# Patient Record
Sex: MALE | Race: OTHER | Employment: FULL TIME | URBAN - METROPOLITAN AREA
[De-identification: names, ages, dates, MRNs, and addresses within clinical notes are randomized per-mention and may not be internally consistent; named-entity substitution may affect disease eponyms.]

---

## 2018-04-21 ENCOUNTER — HOSPITAL ENCOUNTER (EMERGENCY)
Age: 33
Discharge: HOME | End: 2018-04-21
Payer: COMMERCIAL

## 2018-04-21 VITALS
HEIGHT: 62 IN | OXYGEN SATURATION: 96 % | HEART RATE: 100 BPM | DIASTOLIC BLOOD PRESSURE: 88 MMHG | RESPIRATION RATE: 14 BRPM | WEIGHT: 180 LBS | TEMPERATURE: 99.5 F | SYSTOLIC BLOOD PRESSURE: 132 MMHG | BODY MASS INDEX: 33.13 KG/M2

## 2018-04-21 DIAGNOSIS — J02.0 STREP PHARYNGITIS: Primary | ICD-10-CM

## 2018-04-21 LAB
RAPID INFLUENZA  B AGN: NEGATIVE
RAPID INFLUENZA A AGN: NEGATIVE
S PYO AG THROAT QL: POSITIVE

## 2018-04-21 PROCEDURE — 86403 PARTICLE AGGLUT ANTBDY SCRN: CPT

## 2018-04-21 PROCEDURE — 99282 EMERGENCY DEPT VISIT SF MDM: CPT

## 2018-04-21 PROCEDURE — 87880 STREP A ASSAY W/OPTIC: CPT

## 2018-04-21 RX ORDER — AZITHROMYCIN 250 MG/1
TABLET, FILM COATED ORAL
Qty: 6 TABLET | Refills: 0 | Status: SHIPPED | OUTPATIENT
Start: 2018-04-21 | End: 2018-05-01

## 2018-04-21 RX ORDER — IBUPROFEN 600 MG/1
600 TABLET ORAL EVERY 8 HOURS PRN
Qty: 20 TABLET | Refills: 0 | Status: SHIPPED | OUTPATIENT
Start: 2018-04-21 | End: 2021-03-05 | Stop reason: ALTCHOICE

## 2018-04-21 ASSESSMENT — ENCOUNTER SYMPTOMS
VOMITING: 0
COUGH: 0
TROUBLE SWALLOWING: 0
SINUS PAIN: 0
SHORTNESS OF BREATH: 0
ABDOMINAL PAIN: 0
NAUSEA: 0
BACK PAIN: 0
SORE THROAT: 1

## 2018-04-21 ASSESSMENT — PAIN DESCRIPTION - DESCRIPTORS: DESCRIPTORS: ACHING

## 2018-04-21 ASSESSMENT — PAIN SCALES - GENERAL: PAINLEVEL_OUTOF10: 10

## 2018-04-21 ASSESSMENT — PAIN DESCRIPTION - LOCATION: LOCATION: GENERALIZED

## 2018-04-21 ASSESSMENT — PAIN DESCRIPTION - PAIN TYPE: TYPE: ACUTE PAIN

## 2021-03-01 ENCOUNTER — APPOINTMENT (OUTPATIENT)
Dept: GENERAL RADIOLOGY | Age: 36
DRG: 247 | End: 2021-03-01

## 2021-03-01 ENCOUNTER — HOSPITAL ENCOUNTER (INPATIENT)
Age: 36
LOS: 1 days | Discharge: HOME | DRG: 247 | End: 2021-03-03
Attending: INTERNAL MEDICINE | Admitting: INTERNAL MEDICINE
Payer: COMMERCIAL

## 2021-03-01 DIAGNOSIS — R07.9 CHEST PAIN, UNSPECIFIED TYPE: Primary | ICD-10-CM

## 2021-03-01 DIAGNOSIS — R94.31 ABNORMAL ECG: ICD-10-CM

## 2021-03-01 LAB
ALBUMIN SERPL-MCNC: 4.4 G/DL (ref 3.5–4.6)
ALP BLD-CCNC: 123 U/L (ref 35–104)
ALT SERPL-CCNC: 38 U/L (ref 0–41)
AMPHETAMINE SCREEN, URINE: ABNORMAL
ANION GAP SERPL CALCULATED.3IONS-SCNC: 8 MEQ/L (ref 9–15)
APTT: 27.1 SEC (ref 24.4–36.8)
AST SERPL-CCNC: 22 U/L (ref 0–40)
BARBITURATE SCREEN URINE: ABNORMAL
BASOPHILS ABSOLUTE: 0.1 K/UL (ref 0–0.2)
BASOPHILS RELATIVE PERCENT: 1 %
BENZODIAZEPINE SCREEN, URINE: ABNORMAL
BILIRUB SERPL-MCNC: 0.3 MG/DL (ref 0.2–0.7)
BUN BLDV-MCNC: 17 MG/DL (ref 6–20)
CALCIUM SERPL-MCNC: 9.3 MG/DL (ref 8.5–9.9)
CANNABINOID SCREEN URINE: ABNORMAL
CHLORIDE BLD-SCNC: 103 MEQ/L (ref 95–107)
CO2: 28 MEQ/L (ref 20–31)
COCAINE METABOLITE SCREEN URINE: ABNORMAL
CREAT SERPL-MCNC: 0.82 MG/DL (ref 0.7–1.2)
D DIMER: <0.27 MG/L FEU (ref 0–0.5)
EOSINOPHILS ABSOLUTE: 0.2 K/UL (ref 0–0.7)
EOSINOPHILS RELATIVE PERCENT: 2.2 %
ETHANOL PERCENT: NORMAL G/DL
ETHANOL: <10 MG/DL (ref 0–0.08)
GFR AFRICAN AMERICAN: >60
GFR NON-AFRICAN AMERICAN: >60
GLOBULIN: 3 G/DL (ref 2.3–3.5)
GLUCOSE BLD-MCNC: 138 MG/DL (ref 70–99)
HCT VFR BLD CALC: 49.4 % (ref 42–52)
HEMOGLOBIN: 16.9 G/DL (ref 14–18)
INR BLD: 0.9
LV EF: 60 %
LVEF MODALITY: NORMAL
LYMPHOCYTES ABSOLUTE: 3.7 K/UL (ref 1–4.8)
LYMPHOCYTES RELATIVE PERCENT: 37 %
Lab: ABNORMAL
MCH RBC QN AUTO: 30.2 PG (ref 27–31.3)
MCHC RBC AUTO-ENTMCNC: 34.3 % (ref 33–37)
MCV RBC AUTO: 87.9 FL (ref 80–100)
METHADONE SCREEN, URINE: ABNORMAL
MONOCYTES ABSOLUTE: 0.8 K/UL (ref 0.2–0.8)
MONOCYTES RELATIVE PERCENT: 7.7 %
NEUTROPHILS ABSOLUTE: 5.3 K/UL (ref 1.4–6.5)
NEUTROPHILS RELATIVE PERCENT: 52.1 %
OPIATE SCREEN URINE: POSITIVE
OXYCODONE URINE: ABNORMAL
PDW BLD-RTO: 13.5 % (ref 11.5–14.5)
PHENCYCLIDINE SCREEN URINE: ABNORMAL
PLATELET # BLD: 243 K/UL (ref 130–400)
POTASSIUM SERPL-SCNC: 4.4 MEQ/L (ref 3.4–4.9)
PRO-BNP: 6 PG/ML
PROPOXYPHENE SCREEN: ABNORMAL
PROTHROMBIN TIME: 12.2 SEC (ref 12.3–14.9)
RBC # BLD: 5.61 M/UL (ref 4.7–6.1)
SARS-COV-2, NAAT: NOT DETECTED
SODIUM BLD-SCNC: 139 MEQ/L (ref 135–144)
TOTAL CK: 158 U/L (ref 0–190)
TOTAL PROTEIN: 7.4 G/DL (ref 6.3–8)
TROPONIN: 0.02 NG/ML (ref 0–0.01)
TROPONIN: <0.01 NG/ML (ref 0–0.01)
WBC # BLD: 10.1 K/UL (ref 4.8–10.8)

## 2021-03-01 PROCEDURE — 96376 TX/PRO/DX INJ SAME DRUG ADON: CPT

## 2021-03-01 PROCEDURE — 82550 ASSAY OF CK (CPK): CPT

## 2021-03-01 PROCEDURE — 93005 ELECTROCARDIOGRAM TRACING: CPT | Performed by: EMERGENCY MEDICINE

## 2021-03-01 PROCEDURE — 96372 THER/PROPH/DIAG INJ SC/IM: CPT

## 2021-03-01 PROCEDURE — G0378 HOSPITAL OBSERVATION PER HR: HCPCS

## 2021-03-01 PROCEDURE — 80307 DRUG TEST PRSMV CHEM ANLYZR: CPT

## 2021-03-01 PROCEDURE — 96375 TX/PRO/DX INJ NEW DRUG ADDON: CPT

## 2021-03-01 PROCEDURE — 85379 FIBRIN DEGRADATION QUANT: CPT

## 2021-03-01 PROCEDURE — 36415 COLL VENOUS BLD VENIPUNCTURE: CPT

## 2021-03-01 PROCEDURE — 82077 ASSAY SPEC XCP UR&BREATH IA: CPT

## 2021-03-01 PROCEDURE — 85730 THROMBOPLASTIN TIME PARTIAL: CPT

## 2021-03-01 PROCEDURE — 6370000000 HC RX 637 (ALT 250 FOR IP): Performed by: PHYSICIAN ASSISTANT

## 2021-03-01 PROCEDURE — 99285 EMERGENCY DEPT VISIT HI MDM: CPT

## 2021-03-01 PROCEDURE — 83880 ASSAY OF NATRIURETIC PEPTIDE: CPT

## 2021-03-01 PROCEDURE — 96374 THER/PROPH/DIAG INJ IV PUSH: CPT

## 2021-03-01 PROCEDURE — 2580000003 HC RX 258: Performed by: PHYSICIAN ASSISTANT

## 2021-03-01 PROCEDURE — 99220 PR INITIAL OBSERVATION CARE/DAY 70 MINUTES: CPT | Performed by: INTERNAL MEDICINE

## 2021-03-01 PROCEDURE — 6360000002 HC RX W HCPCS: Performed by: PHYSICIAN ASSISTANT

## 2021-03-01 PROCEDURE — 6370000000 HC RX 637 (ALT 250 FOR IP): Performed by: INTERNAL MEDICINE

## 2021-03-01 PROCEDURE — 84484 ASSAY OF TROPONIN QUANT: CPT

## 2021-03-01 PROCEDURE — 71045 X-RAY EXAM CHEST 1 VIEW: CPT

## 2021-03-01 PROCEDURE — 87635 SARS-COV-2 COVID-19 AMP PRB: CPT

## 2021-03-01 PROCEDURE — 85610 PROTHROMBIN TIME: CPT

## 2021-03-01 PROCEDURE — 80053 COMPREHEN METABOLIC PANEL: CPT

## 2021-03-01 PROCEDURE — 93010 ELECTROCARDIOGRAM REPORT: CPT | Performed by: INTERNAL MEDICINE

## 2021-03-01 PROCEDURE — 93306 TTE W/DOPPLER COMPLETE: CPT

## 2021-03-01 PROCEDURE — 85025 COMPLETE CBC W/AUTO DIFF WBC: CPT

## 2021-03-01 RX ORDER — ASPIRIN 81 MG/1
324 TABLET, CHEWABLE ORAL ONCE
Status: COMPLETED | OUTPATIENT
Start: 2021-03-01 | End: 2021-03-01

## 2021-03-01 RX ORDER — ONDANSETRON 2 MG/ML
4 INJECTION INTRAMUSCULAR; INTRAVENOUS ONCE
Status: COMPLETED | OUTPATIENT
Start: 2021-03-01 | End: 2021-03-01

## 2021-03-01 RX ORDER — POLYETHYLENE GLYCOL 3350 17 G/17G
17 POWDER, FOR SOLUTION ORAL DAILY PRN
Status: DISCONTINUED | OUTPATIENT
Start: 2021-03-01 | End: 2021-03-03 | Stop reason: HOSPADM

## 2021-03-01 RX ORDER — ATORVASTATIN CALCIUM 80 MG/1
80 TABLET, FILM COATED ORAL NIGHTLY
Status: DISCONTINUED | OUTPATIENT
Start: 2021-03-01 | End: 2021-03-01

## 2021-03-01 RX ORDER — 0.9 % SODIUM CHLORIDE 0.9 %
1000 INTRAVENOUS SOLUTION INTRAVENOUS ONCE
Status: COMPLETED | OUTPATIENT
Start: 2021-03-01 | End: 2021-03-01

## 2021-03-01 RX ORDER — ONDANSETRON 2 MG/ML
4 INJECTION INTRAMUSCULAR; INTRAVENOUS EVERY 6 HOURS PRN
Status: DISCONTINUED | OUTPATIENT
Start: 2021-03-01 | End: 2021-03-03 | Stop reason: HOSPADM

## 2021-03-01 RX ORDER — MORPHINE SULFATE 2 MG/ML
4 INJECTION, SOLUTION INTRAMUSCULAR; INTRAVENOUS ONCE
Status: COMPLETED | OUTPATIENT
Start: 2021-03-01 | End: 2021-03-01

## 2021-03-01 RX ORDER — SODIUM CHLORIDE 0.9 % (FLUSH) 0.9 %
10 SYRINGE (ML) INJECTION EVERY 12 HOURS SCHEDULED
Status: DISCONTINUED | OUTPATIENT
Start: 2021-03-01 | End: 2021-03-03 | Stop reason: HOSPADM

## 2021-03-01 RX ORDER — SODIUM CHLORIDE 0.9 % (FLUSH) 0.9 %
10 SYRINGE (ML) INJECTION PRN
Status: DISCONTINUED | OUTPATIENT
Start: 2021-03-01 | End: 2021-03-03 | Stop reason: HOSPADM

## 2021-03-01 RX ORDER — ACETAMINOPHEN 325 MG/1
650 TABLET ORAL EVERY 6 HOURS PRN
Status: DISCONTINUED | OUTPATIENT
Start: 2021-03-01 | End: 2021-03-03 | Stop reason: HOSPADM

## 2021-03-01 RX ORDER — ATORVASTATIN CALCIUM 20 MG/1
20 TABLET, FILM COATED ORAL NIGHTLY
Status: DISCONTINUED | OUTPATIENT
Start: 2021-03-01 | End: 2021-03-02

## 2021-03-01 RX ORDER — NITROGLYCERIN 0.4 MG/1
0.4 TABLET SUBLINGUAL EVERY 5 MIN PRN
Status: DISCONTINUED | OUTPATIENT
Start: 2021-03-01 | End: 2021-03-01 | Stop reason: SDUPTHER

## 2021-03-01 RX ORDER — ASPIRIN 81 MG/1
81 TABLET, CHEWABLE ORAL DAILY
Status: DISCONTINUED | OUTPATIENT
Start: 2021-03-02 | End: 2021-03-03 | Stop reason: HOSPADM

## 2021-03-01 RX ORDER — NITROGLYCERIN 0.4 MG/1
0.4 TABLET SUBLINGUAL EVERY 5 MIN PRN
Status: DISCONTINUED | OUTPATIENT
Start: 2021-03-01 | End: 2021-03-03 | Stop reason: HOSPADM

## 2021-03-01 RX ORDER — ACETAMINOPHEN 650 MG/1
650 SUPPOSITORY RECTAL EVERY 6 HOURS PRN
Status: DISCONTINUED | OUTPATIENT
Start: 2021-03-01 | End: 2021-03-03 | Stop reason: HOSPADM

## 2021-03-01 RX ORDER — PROMETHAZINE HYDROCHLORIDE 12.5 MG/1
12.5 TABLET ORAL EVERY 6 HOURS PRN
Status: DISCONTINUED | OUTPATIENT
Start: 2021-03-01 | End: 2021-03-03 | Stop reason: HOSPADM

## 2021-03-01 RX ADMIN — ONDANSETRON 4 MG: 2 INJECTION INTRAMUSCULAR; INTRAVENOUS at 08:57

## 2021-03-01 RX ADMIN — ATORVASTATIN CALCIUM 20 MG: 20 TABLET, FILM COATED ORAL at 20:52

## 2021-03-01 RX ADMIN — MORPHINE SULFATE 4 MG: 2 INJECTION, SOLUTION INTRAMUSCULAR; INTRAVENOUS at 08:57

## 2021-03-01 RX ADMIN — NITROGLYCERIN 0.4 MG: 0.4 TABLET, ORALLY DISINTEGRATING SUBLINGUAL at 07:12

## 2021-03-01 RX ADMIN — ASPIRIN 324 MG: 81 TABLET, CHEWABLE ORAL at 07:03

## 2021-03-01 RX ADMIN — NITROGLYCERIN 0.4 MG: 0.4 TABLET, ORALLY DISINTEGRATING SUBLINGUAL at 07:22

## 2021-03-01 RX ADMIN — SODIUM CHLORIDE 1000 ML: 9 INJECTION, SOLUTION INTRAVENOUS at 07:20

## 2021-03-01 RX ADMIN — ONDANSETRON 4 MG: 2 INJECTION INTRAMUSCULAR; INTRAVENOUS at 07:04

## 2021-03-01 RX ADMIN — ENOXAPARIN SODIUM 40 MG: 40 INJECTION SUBCUTANEOUS at 20:52

## 2021-03-01 RX ADMIN — NITROGLYCERIN 1 INCH: 20 OINTMENT TOPICAL at 07:29

## 2021-03-01 RX ADMIN — NITROGLYCERIN 0.4 MG: 0.4 TABLET, ORALLY DISINTEGRATING SUBLINGUAL at 07:06

## 2021-03-01 RX ADMIN — SODIUM CHLORIDE, PRESERVATIVE FREE 10 ML: 5 INJECTION INTRAVENOUS at 20:53

## 2021-03-01 ASSESSMENT — ENCOUNTER SYMPTOMS
EYES NEGATIVE: 1
WHEEZING: 0
ABDOMINAL PAIN: 0
ALLERGIC/IMMUNOLOGIC NEGATIVE: 1
NAUSEA: 1
VOMITING: 0
SHORTNESS OF BREATH: 1

## 2021-03-01 ASSESSMENT — PAIN DESCRIPTION - DESCRIPTORS
DESCRIPTORS: ACHING

## 2021-03-01 ASSESSMENT — PAIN SCALES - GENERAL
PAINLEVEL_OUTOF10: 4
PAINLEVEL_OUTOF10: 0
PAINLEVEL_OUTOF10: 4
PAINLEVEL_OUTOF10: 0
PAINLEVEL_OUTOF10: 9

## 2021-03-01 ASSESSMENT — PAIN DESCRIPTION - FREQUENCY
FREQUENCY: CONTINUOUS
FREQUENCY: CONTINUOUS

## 2021-03-01 ASSESSMENT — PAIN DESCRIPTION - LOCATION
LOCATION: CHEST

## 2021-03-01 ASSESSMENT — PAIN DESCRIPTION - PAIN TYPE
TYPE: ACUTE PAIN

## 2021-03-01 NOTE — LETTER
[facility] 1W Telemetry  [place] [address]  Phone: [phone]    No name on file. March 3, 2021     Patient: Omar Bower   YOB: 1985   Date of Visit: 3/1/2021       To Whom It May Concern: It is my medical opinion that Omar Bower may return to work on monday March 8th, 2021 without restrictions. .    If you have any questions or concerns, please don't hesitate to call. Sincerely,    Electronically signed by Stoney Baker DO on 3/3/2021 at 10:59 AM      No name on file.

## 2021-03-01 NOTE — ED PROVIDER NOTES
[address] [facility]  eMERGENCY dEPARTMENT eNCOUnter      Pt Name: Mireya Short  MRN: [id number]  [place] 1985  Date of evaluation: 3/1/2021  Provider: Daija Redd PA-C    CHIEF COMPLAINT       Chief Complaint   Patient presents with    Chest Pain         HISTORY OF PRESENT ILLNESS   (Location/Symptom, Timing/Onset,Context/Setting, Quality, Duration, Modifying Factors, Severity)  Note limiting factors. Mireya Short is a 38 y.o. male who presents to the emergency department complaint of left-sided chest pain which patient states started approximately 4 AM in the morning, awakening him from sleep. Patient denies any past history of heart disease. Patient states that the time of onset he did have some shortness of breath, nausea, no vomiting. Patient states no radiation of pain, denies any acute injury, he states he does have increased pain with deep inspiration, there is no pain with palpation, patient denies any acute injury. He rates his current pain is an 8 out of 10 at this time. She denies any past medical history, he states he smokes cigarettes approximately 3/day, does not consume alcohol, and denies any substance abuse issues. HPI    NursingNotes were reviewed. REVIEW OF SYSTEMS    (2-9 systems for level 4, 10 or more for level 5)     Review of Systems   Constitutional: Negative for activity change and appetite change. HENT: Negative for congestion, ear discharge, ear pain, nosebleeds, rhinorrhea and sore throat. Eyes: Negative for discharge. Respiratory: Negative for shortness of breath. Cardiovascular: Positive for chest pain. Negative for palpitations and leg swelling. Gastrointestinal: Positive for nausea. Negative for abdominal distention, abdominal pain, constipation, diarrhea and vomiting. Genitourinary: Negative for difficulty urinating and dysuria. Musculoskeletal: Negative for arthralgias.    Skin: Negative for color change, pallor, rash and wound.   Neurological: Negative for dizziness, tremors, syncope, weakness, numbness and headaches. Psychiatric/Behavioral: Negative for agitation and confusion. Except as noted above the remainder of the review of systems was reviewed and negative. PAST MEDICAL HISTORY   History reviewed. No pertinent past medical history. SURGICALHISTORY     History reviewed. No pertinent surgical history. CURRENT MEDICATIONS       Current Discharge Medication List      CONTINUE these medications which have NOT CHANGED    Details   ibuprofen (IBU) 600 MG tablet Take 1 tablet by mouth every 8 hours as needed for Pain  Qty: 20 tablet, Refills: 0             ALLERGIES     Pcn [penicillins] and Shellfish-derived products    FAMILY HISTORY     History reviewed. No pertinent family history.        SOCIAL HISTORY       Social History     Socioeconomic History    Marital status:      Spouse name: None    Number of children: None    Years of education: None    Highest education level: None   Occupational History    None   Social Needs    Financial resource strain: None    Food insecurity     Worry: None     Inability: None    Transportation needs     Medical: None     Non-medical: None   Tobacco Use    Smoking status: Current Every Day Smoker     Types: Cigarettes    Smokeless tobacco: Never Used   Substance and Sexual Activity    Alcohol use: Yes     Comment: weekends    Drug use: No    Sexual activity: None   Lifestyle    Physical activity     Days per week: None     Minutes per session: None    Stress: None   Relationships    Social connections     Talks on phone: None     Gets together: None     Attends Episcopal service: None     Active member of club or organization: None     Attends meetings of clubs or organizations: None     Relationship status: None    Intimate partner violence     Fear of current or ex partner: None     Emotionally abused: None     Physically abused: None     Forced sexual activity: None   Other Topics Concern    None   Social History Narrative    None       SCREENINGS    Concepcion Coma Scale  Eye Opening: Spontaneous  Best Verbal Response: Oriented  Best Motor Response: Obeys commands  [place] Coma Scale Score: 15 @FLOW([id number])@      PHYSICAL EXAM    (up to 7 for level 4, 8 or more for level 5)     ED Triage Vitals [03/01/21 0635]   BP Temp Temp Source Pulse Resp SpO2 Height Weight   (!) 169/101 97.9 °F (36.6 °C) Temporal 84 20 98 % 5' 2\" (1.575 m) 198 lb (89.8 kg)       Physical Exam  Vitals signs and nursing note reviewed. Constitutional:       General: He is not in acute distress. Appearance: He is well-developed. He is not ill-appearing, toxic-appearing or diaphoretic. HENT:      Head: Normocephalic. Nose: No congestion. Mouth/Throat:      Mouth: Mucous membranes are moist.      Pharynx: No oropharyngeal exudate or posterior oropharyngeal erythema. Eyes:      Extraocular Movements: Extraocular movements intact. Conjunctiva/sclera: Conjunctivae normal.      Pupils: Pupils are equal, round, and reactive to light. Neck:      Musculoskeletal: Normal range of motion and neck supple. No neck rigidity. Vascular: No JVD. Trachea: No tracheal deviation. Cardiovascular:      Rate and Rhythm: Normal rate. Pulses: Normal pulses. Heart sounds: Normal heart sounds. No murmur. No friction rub. No gallop. Pulmonary:      Effort: Pulmonary effort is normal. No tachypnea, accessory muscle usage, respiratory distress or retractions. Breath sounds: No stridor. No wheezing, rhonchi or rales. Comments: Lung sounds are clear in all fields, there is no wheezes rales or rhonchi, accessory muscle use, no retractions. Room Air saturations are 98%  Chest:      Chest wall: No tenderness. Abdominal:      General: Abdomen is flat. Bowel sounds are normal. There is no distension or abdominal bruit. Palpations:  There is no shifting dullness, fluid wave, hepatomegaly, splenomegaly, mass or pulsatile mass. Tenderness: There is no abdominal tenderness. There is no right CVA tenderness, left CVA tenderness, guarding or rebound. Negative signs include Lemus's sign, Rovsing's sign and McBurney's sign. Musculoskeletal:         General: No deformity. Right lower leg: No edema. Left lower leg: No edema. Skin:     General: Skin is warm and dry. Capillary Refill: Capillary refill takes less than 2 seconds. Coloration: Skin is not jaundiced. Neurological:      General: No focal deficit present. Mental Status: He is alert and oriented to person, place, and time. Mental status is at baseline. Cranial Nerves: No cranial nerve deficit. Sensory: No sensory deficit. Motor: No weakness. Coordination: Coordination normal.   Psychiatric:         Mood and Affect: Mood normal.         DIAGNOSTIC RESULTS     EKG: All EKG's are interpreted by the Emergency Department Physician who either signs or Co-signsthis chart in the absence of a cardiologist.    EKG shows normal sinus rhythm at 78 bpm there is T wave inversions in leads III aVF no other acute ST segment abnormality no ventricular ectopy QTC is 417 ms. No previous EKGs are available for comparison at this time    RADIOLOGY:   Non-plain filmimages such as CT, Ultrasound and MRI are read by the radiologist. Plain radiographic images are visualized and preliminarily interpreted by the emergency physician with the below findings:    Chest x-ray shows no acute pulmonary process    Interpretation per the Radiologist below, if available at the time ofthis note:    XR CHEST PORTABLE   Final Result   NO ACUTE CARDIOPULMONARY ABNORMALITY.                ED BEDSIDE ULTRASOUND:   Performed by ED Physician - none    LABS:  Labs Reviewed   COMPREHENSIVE METABOLIC PANEL - Abnormal; Notable for the following components:       Result Value    Anion Gap 8 (*)     Glucose 138 (*)     Alkaline Phosphatase 123 (*)     All other components within normal limits   PROTIME-INR - Abnormal; Notable for the following components:    Protime 12.2 (*)     All other components within normal limits   URINE DRUG SCREEN - Abnormal; Notable for the following components:    Opiate Scrn, Ur POSITIVE (*)     All other components within normal limits   TROPONIN - Abnormal; Notable for the following components:    Troponin 0.022 (*)     All other components within normal limits    Narrative:     CALL  Tadeo  LC1W tel. [phone],  Troponin results called to and read back by Kika Arzola, 03/01/2021  20:55, by [place]   TROPONIN - Abnormal; Notable for the following components:    Troponin 0.079 (*)     All other components within normal limits    Narrative:     CALL  Tadeo  LC1W tel. [phone],  TROPONIN results called to and read back by Cee Sanderson, 03/02/2021 00:49,  by [place] - Abnormal; Notable for the following components:    Cholesterol, Total 252 (*)     Triglycerides 347 (*)     HDL 33 (*)     LDL Calculated 150 (*)     All other components within normal limits   COVID-19, RAPID   CBC WITH AUTO DIFFERENTIAL   ETHANOL   TROPONIN   CK   APTT   D-DIMER, QUANTITATIVE   TROPONIN   TROPONIN   BRAIN NATRIURETIC PEPTIDE   MAGNESIUM   CBC   URINE DRUG SCREEN   URINE RT REFLEX TO CULTURE       All other labs were within normal range or not returned as of this dictation.     EMERGENCY DEPARTMENT COURSE and DIFFERENTIAL DIAGNOSIS/MDM:   Vitals:    Vitals:    03/01/21 1047 03/01/21 1326 03/01/21 1932 03/02/21 0110   BP: (!) 140/71 138/68 (!) 142/75 (!) 146/89   Pulse: 88 81 87 81   Resp: 18  18    Temp: 97.2 °F (36.2 °C)  97.5 °F (36.4 °C) 97.7 °F (36.5 °C)   TempSrc: Oral  Oral    SpO2: 97% 97% 98% 99%   Weight: 207 lb (93.9 kg)      Height: 5' 2\" (1.575 m)             MDM  Number of Diagnoses or Management Options  Abnormal ECG  Chest pain, unspecified type  Diagnosis management comments: Patient presented to ED with complaint of sudden onset of chest pain which woke him up at 4 AM this morning. Patient stated was left-sided, he describes it as a heavy squeezing or pressure type pain to the left side of his chest along with shortness of breath and nausea. He denies any past history of coronary disease, he has no outstanding medical history according to patient. He is occasional smoker, drinks occasionally, denies any substance abuse issues. EKG shows T wave inversions in leads III and aVF. There are no old EKGs available for comparison at this time. Patient's chest pain initially is a 7 out of 10, after aspirin and nitroglycerin he is down to approximately a 3 out of 10. He states that his pain did begin to worsen, he was given morphine and Zofran and is much more comfortable at this time. His cardiac enzymes are negative initial set, I did speak with [facility] cardiology Dr. Claire Mars, he will accept admission of this patient for further evaluation management for chest pain. CRITICAL CARE TIME   Total Critical Care time was 0 minutes, excluding separately reportableprocedures. There was a high probability of clinicallysignificant/life threatening deterioration in the patient's condition which required my urgent intervention. CONSULTS:  IP CONSULT TO CARDIOLOGY    PROCEDURES:  Unless otherwise noted below, none     Procedures    FINAL IMPRESSION      1. Chest pain, unspecified type    2. Abnormal ECG          DISPOSITION/PLAN   DISPOSITION Admitted 03/01/2021 08:52:22 AM      PATIENT REFERRED TO:  No follow-up provider specified.     DISCHARGE MEDICATIONS:  Current Discharge Medication List             (Please note that portions of this note were completed with a voice recognition program.  Efforts were made to edit the dictations but occasionally words are mis-transcribed.)    Cleopatra Tuttle PA-C (electronically signed)  Attending Emergency Physician         Cleopatra Tuttle PA-C  03/01/21 3[facility] Jose Alford PA-C  03/02/21 6622

## 2021-03-01 NOTE — H&P
Chief Complaint   Patient presents with    Chest Pain        Patient is a 38 y.o. male who presents with a chief complaint of chest pain. Patient states he developed a left-sided chest discomfort/910 chest pain that awakened him from sleep lasting for hours and actually still ongoing 3 out of 10 at this time. States he had associated shortness of breath, nausea and diaphoresis. Patient states his symptoms improved with sublingual nitroglycerin. Patient denies any history of myocardial infarction, congestive heart failure or arrhythmia. He denies any history of stress test or cardiac catheterization. Admits to history of hypertension hyperlipidemia. He also admits to smoking but denies any drugs. Initial blood pressure on arrival was 169/101. Initial cardiac enzyme is negative. EKG with normal sinus rhythm, T wave inversion in leads III and aVF. History reviewed. No pertinent past medical history. Patient Active Problem List   Diagnosis    Chest pain       History reviewed. No pertinent surgical history.     Social History     Socioeconomic History    Marital status:      Spouse name: None    Number of children: None    Years of education: None    Highest education level: None   Occupational History    None   Social Needs    Financial resource strain: None    Food insecurity     Worry: None     Inability: None    Transportation needs     Medical: None     Non-medical: None   Tobacco Use    Smoking status: Current Every Day Smoker     Types: Cigarettes    Smokeless tobacco: Never Used   Substance and Sexual Activity    Alcohol use: Yes     Comment: weekends    Drug use: No    Sexual activity: None   Lifestyle    Physical activity     Days per week: None     Minutes per session: None    Stress: None   Relationships    Social connections     Talks on phone: None     Gets together: None     Attends Sabianist service: None     Active member of club or organization: None Attends meetings of clubs or organizations: None     Relationship status: None    Intimate partner violence     Fear of current or ex partner: None     Emotionally abused: None     Physically abused: None     Forced sexual activity: None   Other Topics Concern    None   Social History Narrative    None       History reviewed. No pertinent family history. Current Facility-Administered Medications   Medication Dose Route Frequency Provider Last Rate Last Admin    nitroGLYCERIN (NITROSTAT) SL tablet 0.4 mg  0.4 mg Sublingual Q5 Min PRN Campos Found, PA-C   0.4 mg at 03/01/21 1612    sodium chloride flush 0.9 % injection 10 mL  10 mL Intravenous 2 times per day Campos Found, PA-C        sodium chloride flush 0.9 % injection 10 mL  10 mL Intravenous PRN Clements Found, PA-C        promethazine (PHENERGAN) tablet 12.5 mg  12.5 mg Oral Q6H PRN Clements Found, PA-C        Or    ondansetron [facility]) injection 4 mg  4 mg Intravenous Q6H PRN Clements Found, PA-C        acetaminophen (TYLENOL) tablet 650 mg  650 mg Oral Q6H PRN Clements Found, PA-C        Or    acetaminophen (TYLENOL) suppository 650 mg  650 mg Rectal Q6H PRN Campos Found, PA-C        polyethylene glycol [facility]) packet 17 g  17 g Oral Daily PRN Clements Found, PA-C        [START ON 3/2/2021] aspirin chewable tablet 81 mg  81 mg Oral Daily Johnathon Mcrae PA-C        enoxaparin (LOVENOX) injection 40 mg  40 mg Subcutaneous Daily Johnathon Mcrae PA-C        atorvastatin (LIPITOR) tablet 80 mg  80 mg Oral Nightly Richard Sherren Shelling, PA-C           ALLERGIES: Pcn [penicillins]    Review of Systems   Constitutional: Positive for diaphoresis. Negative for chills and fever. HENT: Negative. Eyes: Negative. Respiratory: Positive for shortness of breath. Negative for wheezing. Cardiovascular: Positive for chest pain. Negative for palpitations and leg swelling. Gastrointestinal: Positive for nausea.  Negative for abdominal pain and vomiting. Endocrine: Negative. Genitourinary: Negative. Musculoskeletal: Negative. Skin: Negative. Negative for rash. Allergic/Immunologic: Negative. Neurological: Negative for dizziness, weakness and headaches. Hematological: Negative. Psychiatric/Behavioral: Negative. VITALS:  Blood pressure (!) 140/71, pulse 88, temperature 97.2 °F (36.2 °C), temperature source Oral, resp. rate 18, height 5' 2\" (1.575 m), weight 207 lb (93.9 kg), SpO2 97 %. Body mass index is 37.86 kg/m². Physical Exam   Constitutional: He is oriented to person, place, and time. He appears well-developed and well-nourished. HENT:   Head: Normocephalic and atraumatic. Eyes: Pupils are equal, round, and reactive to light. Neck: Normal range of motion. Neck supple. No JVD present. No tracheal deviation present. No thyromegaly present. Cardiovascular: Normal rate, regular rhythm, normal heart sounds and intact distal pulses. PMI is not displaced. Exam reveals no gallop, no S3, no distant heart sounds and no friction rub. No murmur heard. Pulmonary/Chest: No respiratory distress. He has no wheezes. He has no rales. He exhibits no tenderness. Abdominal: Soft. Bowel sounds are normal. He exhibits no distension and no mass. There is no abdominal tenderness. There is no rebound and no guarding. Musculoskeletal:         General: No edema. Neurological: He is alert and oriented to person, place, and time. No cranial nerve deficit. Skin: Skin is warm and dry. No rash noted. He is not diaphoretic. No erythema. No pallor. Psychiatric: He has a normal mood and affect.  His behavior is normal. Judgment and thought content normal.       LABS:  Recent Results (from the past 24 hour(s))   EKG 12 Lead    Collection Time: 03/01/21  6:44 AM   Result Value Ref Range    Ventricular Rate 78 BPM    Atrial Rate 78 BPM    P-R Interval 144 ms    QRS Duration 78 ms    Q-T Interval 366 ms    QTc Calculation ([place]) 417 ms    P Axis 14 degrees    R Axis 45 degrees    T Axis -5 degrees   Comprehensive Metabolic Panel    Collection Time: 03/01/21  7:00 AM   Result Value Ref Range    Sodium 139 135 - 144 mEq/L    Potassium 4.4 3.4 - 4.9 mEq/L    Chloride 103 95 - 107 mEq/L    CO2 28 20 - 31 mEq/L    Anion Gap 8 (L) 9 - 15 mEq/L    Glucose 138 (H) 70 - 99 mg/dL    BUN 17 6 - 20 mg/dL    CREATININE 0.82 0.70 - 1.20 mg/dL    GFR Non-African American >60.0 >60    GFR  >60.0 >60    Calcium 9.3 8.5 - 9.9 mg/dL    Total Protein 7.4 6.3 - 8.0 g/dL    Albumin 4.4 3.5 - 4.6 g/dL    Total Bilirubin 0.3 0.2 - 0.7 mg/dL    Alkaline Phosphatase 123 (H) 35 - 104 U/L    ALT 38 0 - 41 U/L    AST 22 0 - 40 U/L    Globulin 3.0 2.3 - 3.5 g/dL   CBC Auto Differential    Collection Time: 03/01/21  7:00 AM   Result Value Ref Range    WBC 10.1 4.8 - 10.8 K/uL    RBC 5.61 4.70 - 6.10 M/uL    Hemoglobin 16.9 14.0 - 18.0 g/dL    Hematocrit 49.4 42.0 - 52.0 %    MCV 87.9 80.0 - 100.0 fL    MCH 30.2 27.0 - 31.3 pg    MCHC 34.3 33.0 - 37.0 %    RDW 13.5 11.5 - 14.5 %    Platelets 406 906 - 321 K/uL    Neutrophils % 52.1 %    Lymphocytes % 37.0 %    Monocytes % 7.7 %    Eosinophils % 2.2 %    Basophils % 1.0 %    Neutrophils Absolute 5.3 1.4 - 6.5 K/uL    Lymphocytes Absolute 3.7 1.0 - 4.8 K/uL    Monocytes Absolute 0.8 0.2 - 0.8 K/uL    Eosinophils Absolute 0.2 0.0 - 0.7 K/uL    Basophils Absolute 0.1 0.0 - 0.2 K/uL   Ethanol    Collection Time: 03/01/21  7:00 AM   Result Value Ref Range    Ethanol Lvl <10 mg/dL    Ethanol percent Not indicated G/dL   Troponin    Collection Time: 03/01/21  7:00 AM   Result Value Ref Range    Troponin <0.010 0.000 - 0.010 ng/mL   CK    Collection Time: 03/01/21  7:00 AM   Result Value Ref Range    Total  0 - 190 U/L   Protime-INR    Collection Time: 03/01/21  7:00 AM   Result Value Ref Range    Protime 12.2 (L) 12.3 - 14.9 sec    INR 0.9    APTT    Collection Time: 03/01/21  7:00 AM Result Value Ref Range    aPTT 27.1 24.4 - 36.8 sec   D-Dimer, Quantitative    Collection Time: 03/01/21  7:00 AM   Result Value Ref Range    D-Dimer, Quant <0.27 0.00 - 0.50 mg/L FEU   COVID-19, Rapid    Collection Time: 03/01/21  7:51 AM    Specimen: Nasopharyngeal Swab   Result Value Ref Range    SARS-CoV-2, NAAT Not Detected Not Detected     Troponin:   Lab Results   Component Value Date    TROPONINI <0.010 03/01/2021       EKG: normal sinus rhythm, T wave inversion in leads III and aVF. ASSESSMENT:    Angina class IV. Rule out ACS  Essential hypertension/hypertensive urgency  Hyperlipidemia  Morbid obesity  Tobacco abuse    PLAN:   1. As always, aggressive risk factor modification is strongly recommended. We should adhere to the JNC VIII guidelines for HTN management and the NCEPATP III guidelines for LDL-C management. 2. ACS orders  3. Check 2D echocardiogram  4. Coronary evaluation when feasible. Questionable stress test versus cath based on work-up. Patient presents with classic anginal symptoms. 5. Monitor on telemetry  6. Urine drug screen  7. Tobacco cessation strongly recommended  8.  GI/DVT prophylaxis      Electronically signed by Candace Howard DO on 3/1/2021 at 1:52 PM

## 2021-03-01 NOTE — ED TRIAGE NOTES
Patient arrived from home with c/o left sided chest pain that started suddenly around 5 am this morning. Patient states pain woke him from his sleep. States pain is  \"9/10\" and describes it as \"pressure\" Denies any cardiac history. Denies taking any prescribed or over the counter meds. Patient is a current smoker. Denies any other problems or concerns. Vitals are stable.

## 2021-03-02 ENCOUNTER — APPOINTMENT (OUTPATIENT)
Dept: CARDIAC CATH/INVASIVE PROCEDURES | Age: 36
DRG: 247 | End: 2021-03-02

## 2021-03-02 LAB
CHOLESTEROL, TOTAL: 252 MG/DL (ref 0–199)
EKG ATRIAL RATE: 77 BPM
EKG ATRIAL RATE: 78 BPM
EKG P AXIS: 14 DEGREES
EKG P AXIS: 15 DEGREES
EKG P-R INTERVAL: 144 MS
EKG P-R INTERVAL: 150 MS
EKG Q-T INTERVAL: 366 MS
EKG Q-T INTERVAL: 368 MS
EKG QRS DURATION: 74 MS
EKG QRS DURATION: 78 MS
EKG QTC CALCULATION (BAZETT): 416 MS
EKG QTC CALCULATION (BAZETT): 417 MS
EKG R AXIS: 34 DEGREES
EKG R AXIS: 45 DEGREES
EKG T AXIS: -5 DEGREES
EKG T AXIS: -9 DEGREES
EKG VENTRICULAR RATE: 77 BPM
EKG VENTRICULAR RATE: 78 BPM
HCT VFR BLD CALC: 46.8 % (ref 42–52)
HDLC SERPL-MCNC: 33 MG/DL (ref 40–59)
HEMOGLOBIN: 15.9 G/DL (ref 14–18)
LDL CHOLESTEROL CALCULATED: 150 MG/DL (ref 0–129)
MAGNESIUM: 2.3 MG/DL (ref 1.7–2.4)
MCH RBC QN AUTO: 30 PG (ref 27–31.3)
MCHC RBC AUTO-ENTMCNC: 33.9 % (ref 33–37)
MCV RBC AUTO: 88.4 FL (ref 80–100)
PDW BLD-RTO: 13.7 % (ref 11.5–14.5)
PLATELET # BLD: 213 K/UL (ref 130–400)
RBC # BLD: 5.29 M/UL (ref 4.7–6.1)
TRIGL SERPL-MCNC: 347 MG/DL (ref 0–150)
TROPONIN: 0.08 NG/ML (ref 0–0.01)
WBC # BLD: 10.1 K/UL (ref 4.8–10.8)

## 2021-03-02 PROCEDURE — 2709999900 HC NON-CHARGEABLE SUPPLY

## 2021-03-02 PROCEDURE — 6360000004 HC RX CONTRAST MEDICATION: Performed by: INTERNAL MEDICINE

## 2021-03-02 PROCEDURE — 2580000003 HC RX 258: Performed by: INTERNAL MEDICINE

## 2021-03-02 PROCEDURE — 92928 PRQ TCAT PLMT NTRAC ST 1 LES: CPT | Performed by: INTERNAL MEDICINE

## 2021-03-02 PROCEDURE — C1725 CATH, TRANSLUMIN NON-LASER: HCPCS

## 2021-03-02 PROCEDURE — 6360000002 HC RX W HCPCS

## 2021-03-02 PROCEDURE — 4A023N7 MEASUREMENT OF CARDIAC SAMPLING AND PRESSURE, LEFT HEART, PERCUTANEOUS APPROACH: ICD-10-PCS | Performed by: INTERNAL MEDICINE

## 2021-03-02 PROCEDURE — 6360000002 HC RX W HCPCS: Performed by: PHYSICIAN ASSISTANT

## 2021-03-02 PROCEDURE — 85027 COMPLETE CBC AUTOMATED: CPT

## 2021-03-02 PROCEDURE — C1769 GUIDE WIRE: HCPCS

## 2021-03-02 PROCEDURE — 96372 THER/PROPH/DIAG INJ SC/IM: CPT

## 2021-03-02 PROCEDURE — 6370000000 HC RX 637 (ALT 250 FOR IP): Performed by: PHYSICIAN ASSISTANT

## 2021-03-02 PROCEDURE — 80061 LIPID PANEL: CPT

## 2021-03-02 PROCEDURE — 93458 L HRT ARTERY/VENTRICLE ANGIO: CPT | Performed by: INTERNAL MEDICINE

## 2021-03-02 PROCEDURE — 2500000003 HC RX 250 WO HCPCS

## 2021-03-02 PROCEDURE — 6360000002 HC RX W HCPCS: Performed by: INTERNAL MEDICINE

## 2021-03-02 PROCEDURE — G0378 HOSPITAL OBSERVATION PER HR: HCPCS

## 2021-03-02 PROCEDURE — C1887 CATHETER, GUIDING: HCPCS

## 2021-03-02 PROCEDURE — 93005 ELECTROCARDIOGRAM TRACING: CPT | Performed by: PHYSICIAN ASSISTANT

## 2021-03-02 PROCEDURE — B2111ZZ FLUOROSCOPY OF MULTIPLE CORONARY ARTERIES USING LOW OSMOLAR CONTRAST: ICD-10-PCS | Performed by: INTERNAL MEDICINE

## 2021-03-02 PROCEDURE — 99226 PR SBSQ OBSERVATION CARE/DAY 35 MINUTES: CPT | Performed by: INTERNAL MEDICINE

## 2021-03-02 PROCEDURE — 027034Z DILATION OF CORONARY ARTERY, ONE ARTERY WITH DRUG-ELUTING INTRALUMINAL DEVICE, PERCUTANEOUS APPROACH: ICD-10-PCS | Performed by: INTERNAL MEDICINE

## 2021-03-02 PROCEDURE — C1874 STENT, COATED/COV W/DEL SYS: HCPCS

## 2021-03-02 PROCEDURE — 2580000003 HC RX 258: Performed by: PHYSICIAN ASSISTANT

## 2021-03-02 PROCEDURE — 93010 ELECTROCARDIOGRAM REPORT: CPT | Performed by: INTERNAL MEDICINE

## 2021-03-02 PROCEDURE — 6370000000 HC RX 637 (ALT 250 FOR IP): Performed by: INTERNAL MEDICINE

## 2021-03-02 PROCEDURE — 83735 ASSAY OF MAGNESIUM: CPT

## 2021-03-02 PROCEDURE — 2580000003 HC RX 258

## 2021-03-02 PROCEDURE — C1894 INTRO/SHEATH, NON-LASER: HCPCS

## 2021-03-02 PROCEDURE — 36415 COLL VENOUS BLD VENIPUNCTURE: CPT

## 2021-03-02 RX ORDER — LISINOPRIL 10 MG/1
10 TABLET ORAL DAILY
Status: DISCONTINUED | OUTPATIENT
Start: 2021-03-02 | End: 2021-03-03 | Stop reason: HOSPADM

## 2021-03-02 RX ORDER — SODIUM CHLORIDE 9 MG/ML
INJECTION, SOLUTION INTRAVENOUS CONTINUOUS
Status: DISCONTINUED | OUTPATIENT
Start: 2021-03-02 | End: 2021-03-03

## 2021-03-02 RX ORDER — PREDNISONE 50 MG/1
50 TABLET ORAL ONCE
Status: DISCONTINUED | OUTPATIENT
Start: 2021-03-02 | End: 2021-03-03 | Stop reason: HOSPADM

## 2021-03-02 RX ORDER — HYDRALAZINE HYDROCHLORIDE 20 MG/ML
10 INJECTION INTRAMUSCULAR; INTRAVENOUS EVERY 10 MIN PRN
Status: DISCONTINUED | OUTPATIENT
Start: 2021-03-02 | End: 2021-03-03 | Stop reason: HOSPADM

## 2021-03-02 RX ORDER — ATORVASTATIN CALCIUM 40 MG/1
40 TABLET, FILM COATED ORAL NIGHTLY
Status: DISCONTINUED | OUTPATIENT
Start: 2021-03-02 | End: 2021-03-03 | Stop reason: HOSPADM

## 2021-03-02 RX ORDER — LABETALOL HYDROCHLORIDE 5 MG/ML
10 INJECTION, SOLUTION INTRAVENOUS EVERY 30 MIN PRN
Status: DISCONTINUED | OUTPATIENT
Start: 2021-03-02 | End: 2021-03-03 | Stop reason: HOSPADM

## 2021-03-02 RX ORDER — DIPHENHYDRAMINE HCL 25 MG
50 TABLET ORAL ONCE
Status: DISCONTINUED | OUTPATIENT
Start: 2021-03-02 | End: 2021-03-03 | Stop reason: HOSPADM

## 2021-03-02 RX ORDER — ACETAMINOPHEN 325 MG/1
650 TABLET ORAL EVERY 4 HOURS PRN
Status: DISCONTINUED | OUTPATIENT
Start: 2021-03-02 | End: 2021-03-03 | Stop reason: HOSPADM

## 2021-03-02 RX ADMIN — SODIUM CHLORIDE, PRESERVATIVE FREE 10 ML: 5 INJECTION INTRAVENOUS at 08:30

## 2021-03-02 RX ADMIN — METOPROLOL TARTRATE 25 MG: 25 TABLET, FILM COATED ORAL at 20:20

## 2021-03-02 RX ADMIN — TICAGRELOR 180 MG: 90 TABLET ORAL at 08:28

## 2021-03-02 RX ADMIN — ENOXAPARIN SODIUM 40 MG: 40 INJECTION SUBCUTANEOUS at 20:17

## 2021-03-02 RX ADMIN — ATORVASTATIN CALCIUM 40 MG: 40 TABLET, FILM COATED ORAL at 20:17

## 2021-03-02 RX ADMIN — METOPROLOL TARTRATE 25 MG: 25 TABLET, FILM COATED ORAL at 08:28

## 2021-03-02 RX ADMIN — ASPIRIN 81 MG: 81 TABLET, CHEWABLE ORAL at 08:28

## 2021-03-02 RX ADMIN — HYDROCORTISONE SODIUM SUCCINATE 200 MG: 100 INJECTION, POWDER, FOR SOLUTION INTRAMUSCULAR; INTRAVENOUS at 14:20

## 2021-03-02 RX ADMIN — SODIUM CHLORIDE: 9 INJECTION, SOLUTION INTRAVENOUS at 10:37

## 2021-03-02 RX ADMIN — SODIUM CHLORIDE: 9 INJECTION, SOLUTION INTRAVENOUS at 18:30

## 2021-03-02 RX ADMIN — IOVERSOL 118 ML: 678 INJECTION INTRA-ARTERIAL; INTRAVENOUS at 15:35

## 2021-03-02 RX ADMIN — TICAGRELOR 90 MG: 90 TABLET ORAL at 20:17

## 2021-03-02 RX ADMIN — LISINOPRIL 10 MG: 10 TABLET ORAL at 18:30

## 2021-03-02 ASSESSMENT — ENCOUNTER SYMPTOMS
EYE DISCHARGE: 0
ABDOMINAL DISTENTION: 0
CONSTIPATION: 0
DIARRHEA: 0
SORE THROAT: 0
SHORTNESS OF BREATH: 0
RHINORRHEA: 0
VOMITING: 0
COLOR CHANGE: 0
NAUSEA: 1
ABDOMINAL PAIN: 0

## 2021-03-02 NOTE — BRIEF OP NOTE
Section of Cardiology  Adult Brief Cardiac Cath Procedure Note        Procedure(s):  LHC, b/l coronary angio, PCI of CX    Pre-operative Diagnosis:  nstemi    H&P Status: Completed and reviewed.      Post-operative Diagnosis:      EF of 65%  NORMAL LVEDP  LM NORMAL   LAD MILD DISEASE  CX 85% DISTAL   % PROX     Findings:  See full report    Complications:  none    Primary Proceduralist:   Dr.Wes Gonzalez DO      Full procedure note to follow

## 2021-03-02 NOTE — FLOWSHEET NOTE
9314- Pt agreeable to heart cath today and consent signed    [id number]- Pt left floor to cath lab    1830- Pt back to floor from cath lab. Right radial site intact, no signs of hematoma. Vitals stable. Pulses are palpable. Pt denies any pain. Pt set up with dinner tray.  Will monitor Electronically signed by Marlene Rodrigez RN on 3/2/2021 at 6:44 PM

## 2021-03-02 NOTE — CARE COORDINATION
[facility] EMERGENCY [facility] AT [place] Case Management Initial Discharge Assessment    Met with Patient to discuss discharge plan. PCP: No primary care provider on file. Date of Last Visit:     If no PCP, list provided? Yes- CHOSE [facility]    Discharge Planning    Living Arrangements: independently at home    Who do you live with? SPOUSE    Who helps you with your care:  self    If lives at home:     Do you have any barriers navigating in your home? no    Patient can perform ADL? Yes    Current Services (outpatient and in home) :  None    Dialysis: No    Is transportation available to get to your appointments? Yes    DME Equipment:  no    Respiratory equipment: None    Respiratory provider:  no     Pharmacy:  yes - [address] [facility] [address]  with Medication Assistance Program?  No      Patient agreeable to [place] [id number]? Declined    Patient agreeable to SNF/Rehab? N/A    Other discharge needs identified? N/A    Freedom of choice list provided with basic dialogue that supports the patient's individualized plan of care/goals and shares the quality data associated with the providers. Yes      The plan for Transition of Care is related to the following treatment goals:CATH    Initial Discharge Plan? (Note: please see concurrent daily documentation for any updates after initial note). HOME, DENIES NEEDS. PT CHOSE PCP AND FREEDOM OF CHOICE OFFERED.      The Patient and/or patient representative: PATIENT was provided with choice of any post-acute providers for care and equipment and agrees with discharge plan  Yes    Electronically signed by William Neil RN on 3/2/2021 at 11:32 AM

## 2021-03-02 NOTE — PROGRESS NOTES
Patient received to pre post space cath. Alert and oriented x4. Denies complaints at this time. Right radial vasc band secure. No bleeding or hematoma.

## 2021-03-02 NOTE — FLOWSHEET NOTE
2100: PM assessment completed; pt denies needs. 0000: Resting at present. 0230: Call from lab; latest troponin=0.079. Dr. Mary Morfin notified via secure message and new order to d/c troponin draws. 0530: Resting with eyes closed.      Electronically signed by Janina Libman, RN on 3/2/2021 at 5:37 AM

## 2021-03-02 NOTE — PROGRESS NOTES
Chief Complaint   Patient presents with    Chest Pain       SUBJECTIVE: Patient with elevated cardiac enzymes. Hemodynamically stable. Echo with normal LV function. History reviewed. No pertinent past medical history.   Patient Active Problem List   Diagnosis    Chest pain       Current Facility-Administered Medications   Medication Dose Route Frequency Provider Last Rate Last Admin    0.9 % sodium chloride infusion   Intravenous Continuous Wes J Holiday, DO 70 mL/hr at 03/02/21 1037 New Bag at 03/02/21 1037    predniSONE (DELTASONE) tablet 50 mg  50 mg Oral Once Wes J Holiday, DO        diphenhydrAMINE (BENADRYL) tablet 50 mg  50 mg Oral Once Wes J Holiday, DO        metoprolol tartrate (LOPRESSOR) tablet 25 mg  25 mg Oral BID Wes J Holiday, DO   25 mg at 03/02/21 1615    nitroGLYCERIN (NITROSTAT) SL tablet 0.4 mg  0.4 mg Sublingual Q5 Min PRN Jay Christopher PA-C   0.4 mg at 03/01/21 8311    sodium chloride flush 0.9 % injection 10 mL  10 mL Intravenous 2 times per day Jay Christopher PA-C   10 mL at 03/02/21 0830    sodium chloride flush 0.9 % injection 10 mL  10 mL Intravenous PRN Jay Christopher PA-C        promethazine (PHENERGAN) tablet 12.5 mg  12.5 mg Oral Q6H PRN Jay Christopher PA-C        Or    ondansetron [facility]) injection 4 mg  4 mg Intravenous Q6H PRN Jay Christopher PA-C        acetaminophen (TYLENOL) tablet 650 mg  650 mg Oral Q6H PRN Jay Christopher PA-C        Or    acetaminophen (TYLENOL) suppository 650 mg  650 mg Rectal Q6H PRN Jay Christopher PA-C        polyethylene glycol (GLYCOLAX) packet 17 g  17 g Oral Daily PRN Jay Christopher PA-C        aspirin chewable tablet 81 mg  81 mg Oral Daily MILTON Faith-C   81 mg at 03/02/21 0828    enoxaparin (LOVENOX) injection 40 mg  40 mg Subcutaneous Daily MILTON Faith-C   40 mg at 03/01/21 2052    atorvastatin (LIPITOR) tablet 20 mg  20 mg Oral Nightly Mike Gonzalez, DO   20 mg at 03/01/21 2052 ALLERGIES: Pcn [penicillins] and Shellfish-derived products      OBJECTIVE:     VITALS:  Blood pressure 130/67, pulse 83, temperature 98.5 °F (36.9 °C), temperature source Oral, resp. rate 18, height 5' 2\" (1.575 m), weight 207 lb (93.9 kg), SpO2 96 %. Body mass index is 37.86 kg/m². Physical Exam   Constitutional: He is oriented to person, place, and time. He appears well-developed and well-nourished. HENT:   Head: Normocephalic and atraumatic. Eyes: Pupils are equal, round, and reactive to light. Neck: Normal range of motion. Neck supple. No JVD present. No tracheal deviation present. No thyromegaly present. Cardiovascular: Normal rate, regular rhythm, normal heart sounds and intact distal pulses. PMI is not displaced. Exam reveals no gallop, no S3, no distant heart sounds and no friction rub. No murmur heard. Pulmonary/Chest: No respiratory distress. He has no wheezes. He has no rales. He exhibits no tenderness. Abdominal: Soft. Bowel sounds are normal. He exhibits no distension and no mass. There is no abdominal tenderness. There is no rebound and no guarding. Musculoskeletal:         General: No edema. Neurological: He is alert and oriented to person, place, and time. No cranial nerve deficit. Skin: Skin is warm and dry. No rash noted. He is not diaphoretic. No erythema. No pallor. Psychiatric: He has a normal mood and affect.  His behavior is normal. Judgment and thought content normal.         LABS:  Recent Results (from the past 24 hour(s))   Troponin    Collection Time: 03/01/21  3:19 PM   Result Value Ref Range    Troponin <0.010 0.000 - 0.010 ng/mL   URINE DRUG SCREEN    Collection Time: 03/01/21  5:10 PM   Result Value Ref Range    Amphetamine Screen, Urine Neg Negative <1000 ng/mL    Barbiturate Screen, Ur Neg Negative < 200 ng/mL    Benzodiazepine Screen, Urine Neg Negative < 200 ng/mL    Cannabinoid Scrn, Ur Neg Negative < 50 ng/mL    Cocaine Metabolite Screen, Urine Neg

## 2021-03-03 VITALS
RESPIRATION RATE: 18 BRPM | HEART RATE: 68 BPM | OXYGEN SATURATION: 96 % | BODY MASS INDEX: 38.09 KG/M2 | DIASTOLIC BLOOD PRESSURE: 60 MMHG | SYSTOLIC BLOOD PRESSURE: 118 MMHG | HEIGHT: 62 IN | WEIGHT: 207 LBS | TEMPERATURE: 97.5 F

## 2021-03-03 LAB
ANION GAP SERPL CALCULATED.3IONS-SCNC: 10 MEQ/L (ref 9–15)
BUN BLDV-MCNC: 14 MG/DL (ref 6–20)
CALCIUM SERPL-MCNC: 9.3 MG/DL (ref 8.5–9.9)
CHLORIDE BLD-SCNC: 103 MEQ/L (ref 95–107)
CO2: 28 MEQ/L (ref 20–31)
CREAT SERPL-MCNC: 0.87 MG/DL (ref 0.7–1.2)
GFR AFRICAN AMERICAN: >60
GFR NON-AFRICAN AMERICAN: >60
GLUCOSE BLD-MCNC: 100 MG/DL (ref 70–99)
HCT VFR BLD CALC: 48.4 % (ref 42–52)
HEMOGLOBIN: 16 G/DL (ref 14–18)
MCH RBC QN AUTO: 29.3 PG (ref 27–31.3)
MCHC RBC AUTO-ENTMCNC: 33 % (ref 33–37)
MCV RBC AUTO: 88.9 FL (ref 80–100)
PDW BLD-RTO: 13.5 % (ref 11.5–14.5)
PLATELET # BLD: 229 K/UL (ref 130–400)
POTASSIUM SERPL-SCNC: 4.4 MEQ/L (ref 3.4–4.9)
RBC # BLD: 5.45 M/UL (ref 4.7–6.1)
SODIUM BLD-SCNC: 141 MEQ/L (ref 135–144)
WBC # BLD: 13.8 K/UL (ref 4.8–10.8)

## 2021-03-03 PROCEDURE — 2060000000 HC ICU INTERMEDIATE R&B

## 2021-03-03 PROCEDURE — 99239 HOSP IP/OBS DSCHRG MGMT >30: CPT | Performed by: INTERNAL MEDICINE

## 2021-03-03 PROCEDURE — 6370000000 HC RX 637 (ALT 250 FOR IP): Performed by: PHYSICIAN ASSISTANT

## 2021-03-03 PROCEDURE — 36415 COLL VENOUS BLD VENIPUNCTURE: CPT

## 2021-03-03 PROCEDURE — 85027 COMPLETE CBC AUTOMATED: CPT

## 2021-03-03 PROCEDURE — 80048 BASIC METABOLIC PNL TOTAL CA: CPT

## 2021-03-03 PROCEDURE — 6370000000 HC RX 637 (ALT 250 FOR IP): Performed by: INTERNAL MEDICINE

## 2021-03-03 RX ORDER — LISINOPRIL 10 MG/1
10 TABLET ORAL DAILY
Qty: 30 TABLET | Refills: 3 | Status: SHIPPED | OUTPATIENT
Start: 2021-03-03

## 2021-03-03 RX ORDER — NITROGLYCERIN 0.4 MG/1
TABLET SUBLINGUAL
Qty: 25 TABLET | Refills: 3 | Status: SHIPPED | OUTPATIENT
Start: 2021-03-03

## 2021-03-03 RX ORDER — ATORVASTATIN CALCIUM 40 MG/1
40 TABLET, FILM COATED ORAL NIGHTLY
Qty: 30 TABLET | Refills: 3 | Status: SHIPPED | OUTPATIENT
Start: 2021-03-03

## 2021-03-03 RX ORDER — ASPIRIN 81 MG/1
81 TABLET, CHEWABLE ORAL DAILY
Qty: 30 TABLET | Refills: 3 | COMMUNITY
Start: 2021-03-03

## 2021-03-03 RX ADMIN — ASPIRIN 81 MG: 81 TABLET, CHEWABLE ORAL at 09:02

## 2021-03-03 RX ADMIN — TICAGRELOR 90 MG: 90 TABLET ORAL at 09:01

## 2021-03-03 RX ADMIN — METOPROLOL TARTRATE 25 MG: 25 TABLET, FILM COATED ORAL at 09:02

## 2021-03-03 RX ADMIN — LISINOPRIL 10 MG: 10 TABLET ORAL at 09:02

## 2021-03-03 NOTE — FLOWSHEET NOTE
2100: PM assessment completed. Right radial dressing dry and intact, no signs of bleeding or hematoma, pulse palpable. Pt denies pain. 0030: Right radial dressing remains dry and intact; pulses palpable. 0530: No change to right radial dressing.      Electronically signed by Theresa Cooley RN on 3/3/2021 at 5:54 AM

## 2021-03-03 NOTE — DISCHARGE SUMMARY
Discharge Summary    Date: 3/3/2021  Patient Name: Dg Alexander YOB: 1985 Age: 38 y.o. Admit Date: 3/1/2021  Discharge Date: 3/3/2021  Discharge Condition: Good    Admission Diagnosis  Chest pain (R07.9)     Discharge Diagnosis  Active Problems: Chest pain Angina, class IV (HCC)Resolved Problems: * No resolved hospital problems. [facility] Stay  Narrative of Hospital Course:  Patient presented with angina type symptoms and hypertensive urgency. Noted to have mildly elevated cardiac enzymes. Echocardiogram with normal LV function no significant valve abnormalities. Underwent cardiac catheterization noted to have 100% proximal RCA with APC and contralateral collaterals as well as an 80 to 85% distal circumflex stenosis status post PCI with 1 drug-eluting stent. Patient was initiated on dual antiplatelet therapy and maximized on cardiac medications and discharged home in stable and satisfactory vision. He was educated on smoking cessation as well as cardiac diet. He was referred to cardiac rehab as well    Consultants:  [address] [place] [address] REHAB    Surgeries/procedures Performed:       Treatments:    Cardiac Medications    Beta-Blocker, Ace Inhibitor and Other    Discharge Plan/Disposition:  Home    Hospital/Incidental Findings Requiring Follow Up:    Patient Instructions:    Diet: Cardiac Diet    Activity:Activity as Tolerated  For number of days (if applicable): Other Instructions:    Provider Follow-Up:   No follow-ups on file.      Significant Diagnostic Studies:    Recent Labs:  Admission on 03/01/2021Ventricular Rate                              Date: 03/01/2021Value: 78          Ref range: BPM                Status: FinalAtrial Rate                                   Date: 03/01/2021Value: 78          Ref range: BPM                Status: FinalP-R Interval                                  Date: 03/01/2021Value: 144         Ref range: ms older using theMDRD formula (not corrected for weight), is valid for stablerenal function. GFR                           Date: 03/01/2021Value: >60.0       Ref range: >60                Status: Final              Comment: >60 mL/min/1.73m2 EGFR, calc. for ages 25 and older using theMDRD formula (not corrected for weight), is valid for stablerenal function. Calcium                                       Date: 03/01/2021Value: 9.3         Ref range: 8.5 - 9.9 mg/dL    Status: FinalTotal Protein                                 Date: 03/01/2021Value: 7.4         Ref range: 6.3 - 8.0 g/dL     Status: FinalAlbumin                                       Date: 03/01/2021Value: 4.4         Ref range: 3.5 - 4.6 g/dL     Status: FinalTotal Bilirubin                               Date: 03/01/2021Value: 0.3         Ref range: 0.2 - 0.7 mg/dL    Status: FinalAlkaline Phosphatase                          Date: 03/01/2021Value: 123*        Ref range: 35 - 104 U/L       Status: FinalALT                                           Date: 03/01/2021Value: 38          Ref range: 0 - 41 U/L         Status: FinalAST                                           Date: 03/01/2021Value: 22          Ref range: 0 - 40 U/L         Status: FinalGlobulin                                      Date: 03/01/2021Value: 3.0         Ref range: 2.3 - 3.5 g/dL     Status: [address] [facility]                                           Date: 03/01/2021Value: 10.1        Ref range: 4.8 - 10.8 K/uL    Status: FinalRBC                                           Date: 03/01/2021Value: 5.61        Ref range: 4.70 - 6.10 M/uL   Status: FinalHemoglobin                                    Date: 03/01/2021Value: 16.9        Ref range: 14.0 - 18.0 g/dL   Status: FinalHematocrit                                    Date: 03/01/2021Value: 49.4        Ref range: 42.0 - 52.0 %      Status: FinalMCV                                           Date: 03/01/2021Value: 87.9        Ref range: 80.0 - 100.0 fL    Status: [address]                                           Date: 03/01/2021Value: 30.2        Ref range: 27.0 - 31.3 pg     Status: [address]                                          Date: 03/01/2021Value: 34.3        Ref range: 33.0 - 37.0 %      Status: FinalRDW                                           Date: 03/01/2021Value: 13.5        Ref range: 11.5 - 14.5 %      Status: FinalPlatelets                                     Date: 03/01/2021Value: 243         Ref range: 130 - 400 K/uL     Status: FinalNeutrophils %                                 Date: 03/01/2021Value: 52.1        Ref range: %                  Status: FinalLymphocytes %                                 Date: 03/01/2021Value: 37.0        Ref range: %                  Status: FinalMonocytes %                                   Date: 03/01/2021Value: 7.7         Ref range: %                  Status: FinalEosinophils %                                 Date: 03/01/2021Value: 2.2         Ref range: %                  Status: FinalBasophils %                                   Date: 03/01/2021Value: 1.0         Ref range: %                  Status: FinalNeutrophils Absolute                          Date: 03/01/2021Value: 5.3         Ref range: 1.4 - 6.5 K/uL     Status: FinalLymphocytes Absolute                          Date: 03/01/2021Value: 3.7         Ref range: 1.0 - 4.8 K/uL     Status: FinalMonocytes Absolute                            Date: 03/01/2021Value: 0.8         Ref range: 0.2 - 0.8 K/uL     Status: FinalEosinophils Absolute                          Date: 03/01/2021Value: 0.2         Ref range: 0.0 - 0.7 K/uL     Status: FinalBasophils Absolute                            Date: 03/01/2021Value: 0.1         Ref range: 0.0 - 0.2 K/uL     Status: FinalEthanol Lvl                                   Date: 03/01/2021Value: <10         Ref range: mg/dL              Status: FinalEthanol percent Comment: Methodology by Troponin T. Troponin                                      Date: 03/01/2021Value: <0.010      Ref range: 0.000 - 0.010 ng*  Status: Final              Comment: Methodology by Troponin T. Amphetamine Screen, Urine                     Date: 03/01/2021Value: Neg         Ref range: Negative <1000 n*  Status: FinalBarbiturate Screen, Ur                        Date: 03/01/2021Value: Neg         Ref range: Negative < 200 n*  Status: FinalBenzodiazepine Screen, Urine                  Date: 03/01/2021Value: Neg         Ref range: Negative < 200 n*  Status: FinalCannabinoid Scrn, Ur                          Date: 03/01/2021Value: Neg         Ref range: Negative < 50 ng*  Status: FinalCocaine Metabolite Screen, Urine              Date: 03/01/2021Value: Neg         Ref range: Negative < 300 n*  Status: FinalOpiate Scrn, Ur                               Date: 03/01/2021Value: POSITIVE*   Ref range: Negative < 300 n*  Status: FinalPCP Screen, Urine                             Date: 03/01/2021Value: Neg         Ref range: Negative < 25 ng*  Status: FinalMethadone Screen, Urine                       Date: 03/01/2021Value: Neg         Ref range: Negative <300 ng*  Status: FinalPropoxyphene Scrn, Ur                         Date: 03/01/2021Value: Neg         Ref range: Negative <300 ng*  Status: FinalOxycodone Urine                               Date: 03/01/2021Value: Neg         Ref range: Negative <100 ng*  Status: FinalDrug Screen Comment:                          Date: 03/01/2021Value: see below     Status: Final              Comment: This method is a screening test to detect only these drugclasses as part of a medical workup. Confirmatory testingby another method should be ordered if clinically indicated. Troponin                                      Date: 03/01/2021Value: 0.022*      Ref range: 0.000 - 0.010 ng*  Status: Final              Comment: Methodology by Troponin T. Troponin Date: 03/01/2021Value: 0.079*      Ref range: 0.000 - 0.010 ng*  Status: Final              Comment: Methodology by Troponin T.Pro-BNP                                       Date: 03/01/2021Value: 6           Ref range: pg/mL              Status: Final              Comment: NT-pro BNP ACUTE Interpretive Guidelines:      Age        Cutoff for Heart Failure   Less than 50 yrs   450 pg/mL   50-75 yrs           900 pg/mL   Greater than 75 yrs  1800 pg/mLNT-pro BNP NON-ACUTE Interpretive Guidelines:    Age                 Reference Range  Less than 74 yrs   0-125 pg/mL  Greater than 74 yrs   0-450 pg/mLOther possible causes of an elevated NT-proBNP include:cardiac ischemia, acute coronary syndrome, COPD, pneumonia,atrial fibrillation, pulmonary emboli, pulmonary hypertension,pericarditisReference:GREGG Lee et al. NT-proBNP testing for diagnosis andshort-term prognosis in acute destabilized HF: an internationalpooled analysis of 1256 patients.  Heart Journal.2006;27:330-337Magnesium                                     Date: 03/02/2021Value: 2.3         Ref range: 1.7 - 2.4 mg/dL    Status: FinalCholesterol, Total                            Date: 03/02/2021Value: 252*        Ref range: 0 - 199 mg/dL      Status: Final              Comment: ATP III Cholesterol Classification is High. Triglycerides                                 Date: 03/02/2021Value: 347*        Ref range: 0 - 150 mg/dL      Status: Final              Comment: ATP III Triglycerides Classification is High. HDL                                           Date: 03/02/2021Value: 33*         Ref range: 40 - 59 mg/dL      Status: Final              Comment: ATP III HDL Cholestrol Classification is low. Expected Values:Males:    >55 = No Risk          35-55 = Moderate Risk          <35 = High RiskFemales:  >65 = No Risk          45-65 = Moderate Risk          <45 = High RiskNCEP Guidelines:   Third Report May 2001>59 = negative risk factor for CHD<40 = major risk factor for CHDLDL Calculated                                Date: 03/02/2021Value: 150*        Ref range: 0 - 129 mg/dL      Status: Final              Comment: ATT III Classification is Borderline High. WBC                                           Date: 03/02/2021Value: 10.1        Ref range: 4.8 - 10.8 K/uL    Status: FinalRBC                                           Date: 03/02/2021Value: 5.29        Ref range: 4.70 - 6.10 M/uL   Status: FinalHemoglobin                                    Date: 03/02/2021Value: 15.9        Ref range: 14.0 - 18.0 g/dL   Status: FinalHematocrit                                    Date: 03/02/2021Value: 46.8        Ref range: 42.0 - 52.0 %      Status: FinalMCV                                           Date: 03/02/2021Value: 88.4        Ref range: 80.0 - 100.0 fL    Status: [address] [place]                                           Date: 03/02/2021Value: 30.0        Ref range: 27.0 - 31.3 pg     Status: [address]                                          Date: 03/02/2021Value: 33.9        Ref range: 33.0 - 37.0 %      Status: FinalRDW                                           Date: 03/02/2021Value: 13.7        Ref range: 11.5 - 14.5 %      Status: FinalPlatelets                                     Date: 03/02/2021Value: 213         Ref range: 130 - 400 K/uL     Status: FinalVentricular Rate                              Date: 03/02/2021Value: 77          Ref range: BPM                Status: FinalAtrial Rate                                   Date: 03/02/2021Value: 77          Ref range: BPM                Status: FinalP-R Interval                                  Date: 03/02/2021Value: 150         Ref range: ms                 Status: FinalQRS Duration                                  Date: 03/02/2021Value: 74          Ref range: ms                 Status: FinalQ-T Interval                                  Date: 03/02/2021Value: 368         Ref range: ms Status: FinalQTc Calculation (Bazett)                      Date: 03/02/2021Value: 416         Ref range: ms                 Status: FinalP Axis                                        Date: 03/02/2021Value: 15          Ref range: degrees            Status: FinalR Axis                                        Date: 03/02/2021Value: 34          Ref range: degrees            Status: FinalT Axis                                        Date: 03/02/2021Value: -9          Ref range: degrees            Status: [address] [facility]                                           Date: 03/03/2021Value: 13.8*       Ref range: 4.8 - 10.8 K/uL    Status: FinalRBC                                           Date: 03/03/2021Value: 5.45        Ref range: 4.70 - 6.10 M/uL   Status: FinalHemoglobin                                    Date: 03/03/2021Value: 16.0        Ref range: 14.0 - 18.0 g/dL   Status: FinalHematocrit                                    Date: 03/03/2021Value: 48.4        Ref range: 42.0 - 52.0 %      Status: FinalMCV                                           Date: 03/03/2021Value: 88.9        Ref range: 80.0 - 100.0 fL    Status: [address] [place]                                           Date: 03/03/2021Value: 29.3        Ref range: 27.0 - 31.3 pg     Status: [address]                                          Date: 03/03/2021Value: 33.0        Ref range: 33.0 - 37.0 %      Status: FinalRDW                                           Date: 03/03/2021Value: 13.5        Ref range: 11.5 - 14.5 %      Status: FinalPlatelets                                     Date: 03/03/2021Value: 229         Ref range: 130 - 400 K/uL     Status: FinalSodium                                        Date: 03/03/2021Value: 141         Ref range: 135 - 144 mEq/L    Status: FinalPotassium                                     Date: 03/03/2021Value: 4.4         Ref range: 3.4 - 4.9 mEq/L    Status: FinalChloride                                      Date: 03/03/2021Value: 103         Ref range: 95 - 107 mEq/L     Status: FinalCO2                                           Date: 03/03/2021Value: 28          Ref range: 20 - 31 mEq/L      Status: FinalAnion Gap                                     Date: 03/03/2021Value: 10          Ref range: 9 - 15 mEq/L       Status: FinalGlucose                                       Date: 03/03/2021Value: 100*        Ref range: 70 - 99 mg/dL      Status: FinalBUN                                           Date: 03/03/2021Value: 14          Ref range: 6 - 20 mg/dL       Status: FinalCREATININE                                    Date: 03/03/2021Value: 0.87        Ref range: 0.70 - 1.20 mg/dL  Status: FinalGFR Non-                      Date: 03/03/2021Value: >60.0       Ref range: >60                Status: Final              Comment: >60 mL/min/1.73m2 EGFR, calc. for ages 25 and older using theMDRD formula (not corrected for weight), is valid for stablerenal function. GFR                           Date: 03/03/2021Value: >60.0       Ref range: >60                Status: Final              Comment: >60 mL/min/1.73m2 EGFR, calc. for ages 25 and older using theMDRD formula (not corrected for weight), is valid for stablerenal function. Calcium                                       Date: 03/03/2021Value: 9.3         Ref range: 8.5 - 9.9 mg/dL    Status: Final------------    Radiology last 7 days:  Xr Chest PortableResult Date: 3/1/2021NO ACUTE CARDIOPULMONARY ABNORMALITY. Pending Labs   Order Current Status  Urine Drug Screen Collected (03/01/21 1703)  Urine Reflex to Culture Collected (03/01/21 1703)      Discharge Medications    Current Discharge Medication ListSTART taking these medicationsaspirin 81 MG chewable tabletTake 1 tablet by mouth dailyQty: 30 tablet Refills: 3nitroGLYCERIN (NITROSTAT) 0.4 MG SL tabletup to max of 3 total doses. If no relief after 1 dose, call 911. Qty: 25 tablet Refills: 3atorvastatin (LIPITOR) 40 MG tabletTake 1 tablet by mouth nightlyQty: 30 tablet Refills: 3lisinopril (PRINIVIL;ZESTRIL) 10 MG tabletTake 1 tablet by mouth dailyQty: 30 tablet Refills: 3metoprolol tartrate (LOPRESSOR) 25 MG tabletTake 1 tablet by mouth 2 times dailyQty: 60 tablet Refills: 3ticagrelor (BRILINTA) 90 MG TABS tabletTake 1 tablet by mouth 2 times dailyQty: 60 tablet Refills: 6    Current Discharge Medication List    Current Discharge Medication ListCONTINUE these medications which have NOT CHANGEDibuprofen (IBU) 600 MG tabletTake 1 tablet by mouth every 8 hours as needed for PainQty: 20 tablet Refills: 0    Current Discharge Medication List    Time Spent on Discharge:3E] minutes were spent in patient examination, evaluation, counseling as well as medication reconciliation, prescriptions for required medications, discharge plan, and follow up.     Electronically signed by Stoney Baker DO on 3/3/21 at 7:47 AM EST

## 2021-03-04 ENCOUNTER — CARE COORDINATION (OUTPATIENT)
Dept: CASE MANAGEMENT | Age: 36
End: 2021-03-04

## 2021-03-04 DIAGNOSIS — R07.9 CHEST PAIN, UNSPECIFIED TYPE: Primary | ICD-10-CM

## 2021-03-04 NOTE — CARE COORDINATION
Challenges to be reviewed by the provider   Additional needs identified to be addressed with provider No  none             Method of communication with provider : none    Advance Care Planning:   Does patient have an Advance Directive:  reviewed and current. Was this a readmission? No  Patient stated reason for admission: CP  Patients top risk factors for readmission: medical condition and utilization of services    Care Transition Nurse (CTN) contacted the patient by telephone to perform post hospital discharge assessment. Verified name and  with patient as identifiers. Provided introduction to self, and explanation of the CTN role. CTN reviewed discharge instructions, medical action plan and red flags with patient who verbalized understanding. Patient given an opportunity to ask questions and does not have any further questions or concerns at this time. Were discharge instructions available to patient? Yes. Reviewed appropriate site of care based on symptoms and resources available to patient including: PCP and Specialist. The patient agrees to contact the PCP office for questions related to their healthcare. Medication reconciliation was performed with patient, who verbalizes understanding of administration of home medications. Advised obtaining a 90-day supply of all daily and as-needed medications. Covid Risk Education    Patient has following risk factors of: no known risk factors. Education provided regarding infection prevention, and signs and symptoms of COVID-19 and when to seek medical attention with patient who verbalized understanding. Discussed exposure protocols and quarantine From CDC: Are you at higher risk for severe illness?   and given an opportunity for questions and concerns. The patient agrees to contact the COVID-19 hotline [phone] or PCP office for questions related to COVID-19.      For more information on steps you can take to protect yourself, see CDC's How to Protect Yourself     Was patient discharged with a pulse oximeter? No Discussed and confirmed pulse oximeter discharge instructions and when to notify provider or seek emergency care. Discussed follow-up appointments. If no appointment was previously scheduled, appointment scheduling offered: Has appt scheduled. Is follow up appointment scheduled within 7 days of discharge? Yes PCP on 3/5/21      Plan for follow-up call in 7-10 days based on severity of symptoms and risk factors. Plan for next call: symptom management-CP  CTN provided contact information for future needs. CTN spoke to Tim Madden through [employer] [place]  #[phone]. Pt denies ongoing CP, palpitations, SOB, dizzy or light headedness. Stated he has all medications except lisinopril and is awaiting call from pharmacy to . Pt has appt with PCP as new pt tomorrow, has transportation. Stated he thinks his Cardiology appt is 3/10/21. Pt has referral to Cardiac rehab and smoking cessation was discussed with pt. No immediate needs or concerns. Reviewed instructions for Nitro if needed.     Andria Maier RN BSN   Care Transitions Nurse  [phone]

## 2021-03-05 ENCOUNTER — OFFICE VISIT (OUTPATIENT)
Dept: FAMILY MEDICINE CLINIC | Age: 36
End: 2021-03-05

## 2021-03-05 VITALS
BODY MASS INDEX: 37.06 KG/M2 | HEART RATE: 72 BPM | WEIGHT: 201.4 LBS | OXYGEN SATURATION: 96 % | SYSTOLIC BLOOD PRESSURE: 116 MMHG | TEMPERATURE: 97.8 F | HEIGHT: 62 IN | DIASTOLIC BLOOD PRESSURE: 70 MMHG

## 2021-03-05 DIAGNOSIS — I20.9 ANGINA, CLASS IV (HCC): Primary | ICD-10-CM

## 2021-03-05 DIAGNOSIS — E66.01 CLASS 2 SEVERE OBESITY WITH SERIOUS COMORBIDITY AND BODY MASS INDEX (BMI) OF 37.0 TO 37.9 IN ADULT, UNSPECIFIED OBESITY TYPE (HCC): ICD-10-CM

## 2021-03-05 DIAGNOSIS — Z72.0 TOBACCO ABUSE: ICD-10-CM

## 2021-03-05 PROCEDURE — 99495 TRANSJ CARE MGMT MOD F2F 14D: CPT | Performed by: FAMILY MEDICINE

## 2021-03-05 PROCEDURE — 1111F DSCHRG MED/CURRENT MED MERGE: CPT | Performed by: FAMILY MEDICINE

## 2021-03-05 ASSESSMENT — ENCOUNTER SYMPTOMS
EYE ITCHING: 0
APNEA: 0
RHINORRHEA: 0
SINUS PRESSURE: 0
BACK PAIN: 0
ABDOMINAL PAIN: 0
EYE DISCHARGE: 0
ABDOMINAL DISTENTION: 0
SINUS PAIN: 0

## 2021-03-05 ASSESSMENT — PATIENT HEALTH QUESTIONNAIRE - PHQ9
2. FEELING DOWN, DEPRESSED OR HOPELESS: 0
SUM OF ALL RESPONSES TO PHQ QUESTIONS 1-9: 0
SUM OF ALL RESPONSES TO PHQ9 QUESTIONS 1 & 2: 0
1. LITTLE INTEREST OR PLEASURE IN DOING THINGS: 0

## 2021-03-05 NOTE — PROGRESS NOTES
Post-Discharge Transitional Care Management Services or Hospital Follow Up      [address]az Calderon [address]   YOB: 1985    Date of Office Visit:  3/5/2021  Date of Hospital Admission: 3/1/21  Date of Hospital Discharge: 3/3/21  Readmission Risk Score(high >=14%. Medium >=10%):Readmission Risk Score: 7      Care management risk score Rising risk (score 2-5) and Complex Care (Scores >=6): 0     Non face to face  following discharge, date last encounter closed (first attempt may have been earlier): *No documented post hospital discharge outreach found in the last 14 days *No documented post hospital discharge outreach found in the last 14 days    Call initiated 2 business days of discharge: *No response recorded in the last 14 days     Patient Active Problem List   Diagnosis    Chest pain    [facility], class IV [facility]    Tobacco abuse    Class 2 severe obesity with serious comorbidity and body mass index (BMI) of 37.0 to 37.9 in adult ([facility] [address]       Allergies   Allergen Reactions    Pcn [Penicillins]     Shellfish-Derived Products        Medications listed as ordered at the time of discharge from [facility]. [address] Medication Instructions ROWAN:    Printed on:03/05/21 1034   Medication Information                      aspirin 81 MG chewable tablet  Take 1 tablet by mouth daily             atorvastatin (LIPITOR) 40 MG tablet  Take 1 tablet by mouth nightly             lisinopril (PRINIVIL;ZESTRIL) 10 MG tablet  Take 1 tablet by mouth daily             metoprolol tartrate (LOPRESSOR) 25 MG tablet  Take 1 tablet by mouth 2 times daily             nicotine polacrilex (NICORETTE) 4 MG gum  Chew 1 piece of gum every 1 to 2 hours (maximum: 24 pieces/day); to increase chances of quitting, chew at least 9 pieces/day during the first 6 weeks             nitroGLYCERIN (NITROSTAT) 0.4 MG SL tablet  up to max of 3 total doses. If no relief after 1 dose, call 911.              ticagrelor (BRILINTA) 90 MG TABS tablet  Take 1 tablet by mouth 2 times daily                   Medications marked \"taking\" at this time  Outpatient Medications Marked as Taking for the 3/5/21 encounter (Office Visit) with Guanako Samuels MD   Medication Sig Dispense Refill    nicotine polacrilex (NICORETTE) 4 MG gum Chew 1 piece of gum every 1 to 2 hours (maximum: 24 pieces/day); to increase chances of quitting, chew at least 9 pieces/day during the first 6 weeks 220 each 2    aspirin 81 MG chewable tablet Take 1 tablet by mouth daily 30 tablet 3    nitroGLYCERIN (NITROSTAT) 0.4 MG SL tablet up to max of 3 total doses. If no relief after 1 dose, call 911. 25 tablet 3    atorvastatin (LIPITOR) 40 MG tablet Take 1 tablet by mouth nightly 30 tablet 3    lisinopril (PRINIVIL;ZESTRIL) 10 MG tablet Take 1 tablet by mouth daily 30 tablet 3    metoprolol tartrate (LOPRESSOR) 25 MG tablet Take 1 tablet by mouth 2 times daily 60 tablet 3        Medications patient taking as of now reconciled against medications ordered at time of hospital discharge: Yes    Chief Complaint   Patient presents with   [address]     Has not had a PCP.  [facility] to [facility] ER with chest pains on 3/1/21. Had stent placed while inpatient. Was d/c home 3/4/21. Does not have f/u with cardiology sched. yet. States he was not able to get the Brilinta becasue the pharmacy was out of it. He is waiting for them to get it in. HPI    Inpatient course: Discharge summary reviewed- see chart. Interval history/Current status:     Patient was admitted to the hospital for chest pain. Admitted to the hospital for 3 days. Has a stent placed. Does not have a follow up with cardiology. Patient is compliant with his medication and is trying to lose weight. Patient still needs to get his brilinta from the pharmacy. Tobacco abuse  Has decreased his smoking   Patient would like to quit   Patient does not want any assistance at hi time. Review of Systems   Constitutional: Negative for activity change, appetite change, fatigue and fever. HENT: Negative for congestion, dental problem, rhinorrhea, sinus pressure and sinus pain. Eyes: Negative for discharge and itching. Respiratory: Negative for apnea. Cardiovascular: Negative for chest pain and leg swelling. Gastrointestinal: Negative for abdominal distention and abdominal pain. Endocrine: Negative for cold intolerance and heat intolerance. Genitourinary: Negative for discharge, dysuria, enuresis and testicular pain. Musculoskeletal: Negative for arthralgias, back pain and myalgias. Allergic/Immunologic: Negative for environmental allergies and food allergies. Neurological: Negative for dizziness and facial asymmetry. Psychiatric/Behavioral: Negative for behavioral problems, confusion, decreased concentration and dysphoric mood. Vitals:    03/05/21 0925   BP: 116/70   Pulse: 72   Temp: 97.8 °F (36.6 °C)   SpO2: 96%   Weight: 201 lb 6.4 oz (91.4 kg)   Height: 5' 1.5\" (1.562 m)     Body mass index is 37.44 kg/m². Wt Readings from Last 3 Encounters:   03/05/21 201 lb 6.4 oz (91.4 kg)   03/01/21 207 lb (93.9 kg)   04/21/18 180 lb (81.6 kg)     BP Readings from Last 3 Encounters:   03/05/21 116/70   03/03/21 118/60   04/21/18 132/88       Physical Exam  Constitutional:       General: He is not in acute distress. Appearance: Normal appearance. He is obese. He is not ill-appearing. HENT:      Head: Normocephalic and atraumatic. Right Ear: Tympanic membrane normal.      Left Ear: Tympanic membrane normal.      Nose: No congestion. Mouth/Throat:      Mouth: Mucous membranes are dry. Eyes:      General:         Right eye: No discharge. Left eye: No discharge. Neck:      Musculoskeletal: Normal range of motion. Cardiovascular:      Rate and Rhythm: Normal rate and regular rhythm. Heart sounds: No murmur.    Pulmonary:      Effort: Pulmonary effort is normal. No respiratory distress. Abdominal:      General: There is no distension. Tenderness: There is no abdominal tenderness. Hernia: No hernia is present. Musculoskeletal: Normal range of motion. General: No swelling or deformity. Skin:     General: Skin is warm and dry. Capillary Refill: Capillary refill takes less than 2 seconds. Neurological:      General: No focal deficit present. Mental Status: He is alert and oriented to person, place, and time. Psychiatric:         Mood and Affect: Mood normal.             Assessment/Plan:  1. Angina, class IV ([address]  Resolved s/p stent. Follow up with cardiology. Counseled on smoking and obesity   - MS DISCHARGE MEDS RECONCILED W/ CURRENT OUTPATIENT MED LIST  - [address] Mike Singh DO, Invasive Cardiology, [place]    2. Tobacco abuse  Would like to try nicotine gum to quit smoking   - nicotine polacrilex (NICORETTE) 4 MG gum; Chew 1 piece of gum every 1 to 2 hours (maximum: 24 pieces/day); to increase chances of quitting, chew at least 9 pieces/day during the first 6 weeks  Dispense: 220 each;  Refill: 2  - MS DISCHARGE MEDS RECONCILED W/ CURRENT OUTPATIENT MED LIST    3. Class 2 severe obesity with serious comorbidity and body mass index (BMI) of 37.0 to 37.9 in adult, unspecified obesity type ([address]    - MS DISCHARGE MEDS RECONCILED W/ CURRENT OUTPATIENT MED LIST        Medical Decision Making: moderate complexity

## 2021-03-09 NOTE — PROGRESS NOTES
Physician Progress Note      PATIENT:               Gabino Palacios  CSN #:                  [id number]  :                       1985  ADMIT DATE:       3/1/2021 6:39 AM  DISCH DATE:        3/3/2021 1:04 PM  RESPONDING  PROVIDER #:        JAMIA ZHENG DO          QUERY TEXT:    Patient admitted with chest pain. Documentation reflects NSTEMI in 3/2/21 PN   and in brief op note. Please document in the progress notes and discharge   summary if NSTEMI was: The medical record reflects the following:  Risk Factors: HTN  HLD  smoking  3/1 positive opiate Scrn, Ur   hypertensive   urgency  Clinical Indicators:  Troponin: 0.022  0.079  H&P: Angina class IV. Rule out ACS  3/2 Dr. Lennie Butcher: Non-ST elevation MI  Angina class IV  3/2 Dr. Lennie Butcher:  Pre-operative Diagnosis:  nstemi   Post-operative Diagnosis:    EF of 65% NORMAL LVEDP  LM NORMAL   LAD MILD DISEASE   CX 85% DISTAL  RCA   100% PROX  3/3 Dr. Lennie Butcher DS: Discharge Diagnosis  Active Problems: Chest pain Angina,   class IV  Patient presented with angina type symptoms and hypertensive   urgency. Noted to have mildly elevated cardiac enzymes. Echocardiogram with   normal LV function no significant valve abnormalities. Underwent cardiac   catheterization noted to have 100% proximal RCA with APC and contralateral   collaterals as well as an 80 to 85% distal circumflex stenosis status post PCI   with 1 drug-eluting stent. Treatment: EKGs  Echo   LHC, b/l coronary angio, PCI of CX  Options provided:  -- NSTEMI ruled out after study  -- NSTEMI confirmed after study  -- Other - I will add my own diagnosis  -- Disagree - Not applicable / Not valid  -- Disagree - Clinically unable to determine / Unknown  -- Refer to Clinical Documentation Reviewer    PROVIDER RESPONSE TEXT:    NSTEMI confirmed after study.     Query created by: Hayes Saravia on 3/3/2021 11:54 AM      Electronically signed by:  Trinh Barrett DO 3/8/2021 9:42 PM

## 2021-03-11 ENCOUNTER — CARE COORDINATION (OUTPATIENT)
Dept: CARE COORDINATION | Age: 36
End: 2021-03-11

## 2021-03-11 NOTE — CARE COORDINATION
[facility] Transitions Follow Up Call    3/11/2021    Patient: Gurpreet Catalan  Patient : 1985   MRN: [id number]  Reason for Admission: [address] 3/1-3/3/21 CP, Angina, S/P Cardiac Cath  Discharge Date: 3/3/21 RARS: Readmission Risk Score: 7         Spoke with: Attempted to contact patient using  Services for follow up Care Transition call. Left HIPPA compliant message requesting return call. Will attempt to reach again. TAVIA Han / [address]  [phone]      Care Transitions Subsequent and Final Call    Subsequent and Final Calls  Care Transitions Interventions  Other Interventions:            Follow Up  Future Appointments   Date Time Provider Urban [place]   2021  9:30 AM Hugo De Guzman MD [address]

## 2021-03-21 ENCOUNTER — CARE COORDINATION (OUTPATIENT)
Dept: CARE COORDINATION | Age: 36
End: 2021-03-21

## 2021-03-21 NOTE — CARE COORDINATION
[facility] Transitions Follow Up Call    3/21/2021    Patient: Keyla Monroy  Patient : 1985   MRN: [id number]  Reason for Admission: [address] 3/1-3/3/21 CP, Angina, S/P Cardiac Cath  Discharge Date: 3/3/21 RARS: Readmission Risk Score: 7      Spoke with: Second attempt made to contact patient using  Services for follow up Care Transition call. Left HIPPA compliant message requesting return call. TAVIA Hanhuda Miles / [address]  [phone]      Care Transitions Subsequent and Final Call    Subsequent and Final Calls  Care Transitions Interventions  Other Interventions:            Follow Up  Future Appointments   Date Time Provider Urban K[place]   2021  9:30 AM Noreen Gilliam  [place], [place]

## 2021-05-02 ENCOUNTER — HOSPITAL ENCOUNTER (EMERGENCY)
Age: 36
Discharge: HOME | End: 2021-05-02
Attending: FAMILY MEDICINE

## 2021-05-02 VITALS
SYSTOLIC BLOOD PRESSURE: 137 MMHG | OXYGEN SATURATION: 96 % | RESPIRATION RATE: 16 BRPM | WEIGHT: 180 LBS | TEMPERATURE: 98.8 F | DIASTOLIC BLOOD PRESSURE: 76 MMHG | BODY MASS INDEX: 33.13 KG/M2 | HEIGHT: 62 IN | HEART RATE: 70 BPM

## 2021-05-02 DIAGNOSIS — K59.00 CONSTIPATION, UNSPECIFIED CONSTIPATION TYPE: ICD-10-CM

## 2021-05-02 DIAGNOSIS — K60.2 ANAL FISSURE: Primary | ICD-10-CM

## 2021-05-02 LAB
ALBUMIN SERPL-MCNC: 4.6 G/DL (ref 3.5–4.6)
ALP BLD-CCNC: 124 U/L (ref 35–104)
ALT SERPL-CCNC: 28 U/L (ref 0–41)
ANION GAP SERPL CALCULATED.3IONS-SCNC: 9 MEQ/L (ref 9–15)
AST SERPL-CCNC: 19 U/L (ref 0–40)
BASOPHILS ABSOLUTE: 0.1 K/UL (ref 0–0.2)
BASOPHILS RELATIVE PERCENT: 0.5 %
BILIRUB SERPL-MCNC: 0.6 MG/DL (ref 0.2–0.7)
BUN BLDV-MCNC: 15 MG/DL (ref 6–20)
CALCIUM SERPL-MCNC: 9 MG/DL (ref 8.5–9.9)
CHLORIDE BLD-SCNC: 105 MEQ/L (ref 95–107)
CO2: 25 MEQ/L (ref 20–31)
CREAT SERPL-MCNC: 0.7 MG/DL (ref 0.7–1.2)
EOSINOPHILS ABSOLUTE: 0 K/UL (ref 0–0.7)
EOSINOPHILS RELATIVE PERCENT: 0.3 %
GFR AFRICAN AMERICAN: >60
GFR NON-AFRICAN AMERICAN: >60
GLOBULIN: 2.7 G/DL (ref 2.3–3.5)
GLUCOSE BLD-MCNC: 93 MG/DL (ref 70–99)
HCT VFR BLD CALC: 47.1 % (ref 42–52)
HEMOGLOBIN: 15.7 G/DL (ref 14–18)
LYMPHOCYTES ABSOLUTE: 1.8 K/UL (ref 1–4.8)
LYMPHOCYTES RELATIVE PERCENT: 12.6 %
MCH RBC QN AUTO: 29.3 PG (ref 27–31.3)
MCHC RBC AUTO-ENTMCNC: 33.3 % (ref 33–37)
MCV RBC AUTO: 88.1 FL (ref 80–100)
MONOCYTES ABSOLUTE: 0.7 K/UL (ref 0.2–0.8)
MONOCYTES RELATIVE PERCENT: 5.3 %
NEUTROPHILS ABSOLUTE: 11.5 K/UL (ref 1.4–6.5)
NEUTROPHILS RELATIVE PERCENT: 81.3 %
PDW BLD-RTO: 13.5 % (ref 11.5–14.5)
PLATELET # BLD: 223 K/UL (ref 130–400)
POTASSIUM SERPL-SCNC: 3.7 MEQ/L (ref 3.4–4.9)
RBC # BLD: 5.35 M/UL (ref 4.7–6.1)
SODIUM BLD-SCNC: 139 MEQ/L (ref 135–144)
TOTAL PROTEIN: 7.3 G/DL (ref 6.3–8)
WBC # BLD: 14.1 K/UL (ref 4.8–10.8)

## 2021-05-02 PROCEDURE — 85025 COMPLETE CBC W/AUTO DIFF WBC: CPT

## 2021-05-02 PROCEDURE — 80053 COMPREHEN METABOLIC PANEL: CPT

## 2021-05-02 PROCEDURE — 36415 COLL VENOUS BLD VENIPUNCTURE: CPT

## 2021-05-02 PROCEDURE — 99283 EMERGENCY DEPT VISIT LOW MDM: CPT

## 2021-05-02 RX ORDER — POLYETHYLENE GLYCOL 3350 17 G/17G
17 POWDER, FOR SOLUTION ORAL DAILY
Qty: 1 BOTTLE | Refills: 0 | Status: SHIPPED | OUTPATIENT
Start: 2021-05-02 | End: 2021-05-09

## 2021-05-02 ASSESSMENT — ENCOUNTER SYMPTOMS
CONSTIPATION: 1
RESPIRATORY NEGATIVE: 1
ALLERGIC/IMMUNOLOGIC NEGATIVE: 1
EYES NEGATIVE: 1

## 2021-05-02 ASSESSMENT — PAIN DESCRIPTION - PAIN TYPE: TYPE: ACUTE PAIN

## 2021-05-02 ASSESSMENT — PAIN SCALES - GENERAL: PAINLEVEL_OUTOF10: 6

## 2021-05-02 ASSESSMENT — PAIN DESCRIPTION - DESCRIPTORS: DESCRIPTORS: ACHING

## 2021-05-02 ASSESSMENT — PAIN DESCRIPTION - FREQUENCY: FREQUENCY: CONTINUOUS

## 2021-05-02 NOTE — ED PROVIDER NOTES
SURGICALHISTORY       Past Surgical History:   Procedure Laterality Date    CORONARY ANGIOPLASTY WITH STENT PLACEMENT           CURRENT MEDICATIONS       Previous Medications    ASPIRIN 81 MG CHEWABLE TABLET    Take 1 tablet by mouth daily    ATORVASTATIN (LIPITOR) 40 MG TABLET    Take 1 tablet by mouth nightly    LISINOPRIL (PRINIVIL;ZESTRIL) 10 MG TABLET    Take 1 tablet by mouth daily    METOPROLOL TARTRATE (LOPRESSOR) 25 MG TABLET    Take 1 tablet by mouth 2 times daily    NICOTINE POLACRILEX (NICORETTE) 4 MG GUM    Chew 1 piece of gum every 1 to 2 hours (maximum: 24 pieces/day); to increase chances of quitting, chew at least 9 pieces/day during the first 6 weeks    NITROGLYCERIN (NITROSTAT) 0.4 MG SL TABLET    up to max of 3 total doses. If no relief after 1 dose, call 911.     TICAGRELOR (BRILINTA) 90 MG TABS TABLET    Take 1 tablet by mouth 2 times daily       ALLERGIES     Pcn [penicillins] and Shellfish-derived products    FAMILY HISTORY       Family History   Problem Relation Age of Onset    Hypertension Mother     Diabetes Mother     Heart Disease Mother     Lung Cancer Father           SOCIAL HISTORY       Social History     Socioeconomic History    Marital status:      Spouse name: None    Number of children: None    Years of education: None    Highest education level: None   Occupational History    None   Social Needs    Financial resource strain: None    Food insecurity     Worry: None     Inability: None    Transportation needs     Medical: None     Non-medical: None   Tobacco Use    Smoking status: Current Every Day Smoker     Packs/day: 0.25     Years: 25.00     Pack years: 6.25     Types: Cigarettes    Smokeless tobacco: Never Used    Tobacco comment: started  age 13   Substance and Sexual Activity    Alcohol use: Yes     Comment: weekends    Drug use: No    Sexual activity: None   Lifestyle    Physical activity     Days per week: None     Minutes per session: None    Stress: None   Relationships    Social connections     Talks on phone: None     Gets together: None     Attends Zoroastrian service: None     Active member of club or organization: None     Attends meetings of clubs or organizations: None     Relationship status: None    Intimate partner violence     Fear of current or ex partner: None     Emotionally abused: None     Physically abused: None     Forced sexual activity: None   Other Topics Concern    None   Social History Narrative    None       SCREENINGS    Concepcion Coma Scale  Eye Opening: Spontaneous  Best Verbal Response: Oriented  Best Motor Response: Obeys commands  Concepcion Coma Scale Score: 15 @FLOW([id number])@      PHYSICAL EXAM    (up to 7 for level 4, 8 or more for level 5)     ED Triage Vitals [05/02/21 1306]   BP Temp Temp Source Pulse Resp SpO2 Height Weight   (!) 145/79 98.8 °F (37.1 °C) Oral 74 16 95 % 5' 2\" (1.575 m) 180 lb (81.6 kg)       Physical Exam  Vitals signs and nursing note reviewed. Constitutional:       Appearance: Normal appearance. He is well-developed. HENT:      Head: Normocephalic and atraumatic. Right Ear: External ear normal.      Left Ear: External ear normal.      Nose: Nose normal.      Mouth/Throat:      Mouth: Mucous membranes are moist.      Pharynx: Oropharynx is clear. Eyes:      Pupils: Pupils are equal, round, and reactive to light. Neck:      Musculoskeletal: Normal range of motion and neck supple. Cardiovascular:      Rate and Rhythm: Normal rate and regular rhythm. Heart sounds: Normal heart sounds. Pulmonary:      Effort: Pulmonary effort is normal. No respiratory distress. Breath sounds: Normal breath sounds. No wheezing or rales. Chest:      Chest wall: No tenderness. Abdominal:      General: Bowel sounds are normal.      Palpations: Abdomen is soft. Genitourinary:     Rectum: Tenderness present.       Comments: Small amount of dried blood and a rectal fissure noted on exam  Musculoskeletal: Normal range of motion. Skin:     General: Skin is warm and dry. Neurological:      Mental Status: He is alert and oriented to person, place, and time. Cranial Nerves: No cranial nerve deficit. Sensory: No sensory deficit. Motor: No abnormal muscle tone. Coordination: Coordination normal.      Deep Tendon Reflexes: Reflexes normal.   Psychiatric:         Behavior: Behavior normal.         Thought Content: Thought content normal.         Judgment: Judgment normal.         DIAGNOSTIC RESULTS     EKG: All EKG's are interpreted by the Emergency Department Physician who either signs or Co-signsthis chart in the absence of a cardiologist.        RADIOLOGY:   Kiara Stabs such as CT, Ultrasound and MRI are read by the radiologist. Plain radiographic images are visualized and preliminarily interpreted by the emergency physician with the below findings:        Interpretation per the Radiologist below, if available at the time ofthis note:    No orders to display         ED BEDSIDE ULTRASOUND:   Performed by ED Physician - none    LABS:  Labs Reviewed   COMPREHENSIVE METABOLIC PANEL - Abnormal; Notable for the following components:       Result Value    Alkaline Phosphatase 124 (*)     All other components within normal limits   CBC WITH AUTO DIFFERENTIAL - Abnormal; Notable for the following components:    WBC 14.1 (*)     Neutrophils Absolute 11.5 (*)     All other components within normal limits       All other labs were within normal range or not returned as of this dictation.     EMERGENCY DEPARTMENT COURSE and DIFFERENTIAL DIAGNOSIS/MDM:   Vitals:    Vitals:    05/02/21 1306   BP: (!) 145/79   Pulse: 74   Resp: 16   Temp: 98.8 °F (37.1 °C)   TempSrc: Oral   SpO2: 95%   Weight: 180 lb (81.6 kg)   Height: 5' 2\" (1.575 m)         MDM  Number of Diagnoses or Management Options  Anal fissure  Diagnosis management comments: 43years old presented to the ER today with left eye burning and pain after a large bowel movement on exam small anal fissure was given a prescription for Doy Belcamp advised to follow-up with surgery as an outpatient       CONSULTS:  None    PROCEDURES:  Unless otherwise noted below, none     Procedures    FINAL IMPRESSION      1. Anal fissure    2.  Constipation, unspecified constipation type          DISPOSITION/PLAN   DISPOSITION Decision To Discharge 05/02/2021 02:41:10 PM      PATIENT REFERRED TO:  Derrick Rawls MD  [address] [facility] [address]  [address] [facility] [id number] [phone]    In 3 days        DISCHARGE MEDICATIONS:  New Prescriptions    NITROGLYCERIN (RECTIV) 0.4 % RECTAL OINTMENT    Place 1 inch rectally every 12 hours          (Please note thatportions of this note were completed with a voice recognition program.  Efforts were made to edit the dictations but occasionally words are mis-transcribed.)    Maciej Calvin MD (electronically signed)  Attending Emergency Physician         Kelli Augustin MD  05/02/21

## 2021-05-02 NOTE — ED NOTES
Patient instructed to  two prescriptions at [facility] on [address]. Discharge instructions explained to patient with verbalization of understanding.       Brian Briceño RN  05/02/21 2641

## 2021-05-02 NOTE — ED TRIAGE NOTES
Pt c/o blood in his stool last night and pain in his rectum, denies trauma, Pt is A&OX3, calm, ambulatory, afebrile, breathes are equal and unlabored.

## 2021-05-02 NOTE — LETTER
[facility]) ED  [address] [address]  Phone: [phone]             May 2, 2021    Patient: Chirag Cat   YOB: 1985   Date of Visit: 5/2/2021       To Whom It May Concern:    Chirag Cat was seen and treated in our emergency department on 5/2/2021.  He may return to work on 05/04/2021      Sincerely,             Signature:__________________________________

## 2021-05-02 NOTE — ED NOTES
In room with Dr Rain for rectal exam. Small fissure noted. Patient reports that he has been having hard stools. Patient reports pain around the external rectum where fissure is noted.       Emely Vences RN  05/02/21 3332

## 2023-02-23 ENCOUNTER — OFFICE VISIT (OUTPATIENT)
Dept: FAMILY MEDICINE CLINIC | Age: 38
End: 2023-02-23

## 2023-02-23 VITALS
OXYGEN SATURATION: 97 % | HEIGHT: 62 IN | DIASTOLIC BLOOD PRESSURE: 78 MMHG | WEIGHT: 200 LBS | TEMPERATURE: 96.8 F | SYSTOLIC BLOOD PRESSURE: 122 MMHG | BODY MASS INDEX: 36.8 KG/M2 | HEART RATE: 93 BPM

## 2023-02-23 DIAGNOSIS — E78.2 MIXED HYPERLIPIDEMIA: ICD-10-CM

## 2023-02-23 DIAGNOSIS — J40 BRONCHITIS: Primary | ICD-10-CM

## 2023-02-23 LAB
INFLUENZA A ANTIBODY: NEGATIVE
INFLUENZA B ANTIBODY: NEGATIVE

## 2023-02-23 RX ORDER — AZITHROMYCIN 250 MG/1
250 TABLET, FILM COATED ORAL SEE ADMIN INSTRUCTIONS
Qty: 6 TABLET | Refills: 0 | Status: SHIPPED | OUTPATIENT
Start: 2023-02-23 | End: 2023-02-28

## 2023-02-23 SDOH — ECONOMIC STABILITY: FOOD INSECURITY: WITHIN THE PAST 12 MONTHS, YOU WORRIED THAT YOUR FOOD WOULD RUN OUT BEFORE YOU GOT MONEY TO BUY MORE.: NEVER TRUE

## 2023-02-23 SDOH — ECONOMIC STABILITY: INCOME INSECURITY: HOW HARD IS IT FOR YOU TO PAY FOR THE VERY BASICS LIKE FOOD, HOUSING, MEDICAL CARE, AND HEATING?: NOT HARD AT ALL

## 2023-02-23 SDOH — ECONOMIC STABILITY: HOUSING INSECURITY
IN THE LAST 12 MONTHS, WAS THERE A TIME WHEN YOU DID NOT HAVE A STEADY PLACE TO SLEEP OR SLEPT IN A SHELTER (INCLUDING NOW)?: NO

## 2023-02-23 SDOH — ECONOMIC STABILITY: FOOD INSECURITY: WITHIN THE PAST 12 MONTHS, THE FOOD YOU BOUGHT JUST DIDN'T LAST AND YOU DIDN'T HAVE MONEY TO GET MORE.: NEVER TRUE

## 2023-02-23 ASSESSMENT — PATIENT HEALTH QUESTIONNAIRE - PHQ9
SUM OF ALL RESPONSES TO PHQ QUESTIONS 1-9: 0
SUM OF ALL RESPONSES TO PHQ QUESTIONS 1-9: 0
2. FEELING DOWN, DEPRESSED OR HOPELESS: 0
1. LITTLE INTEREST OR PLEASURE IN DOING THINGS: 0
SUM OF ALL RESPONSES TO PHQ QUESTIONS 1-9: 0
SUM OF ALL RESPONSES TO PHQ QUESTIONS 1-9: 0
SUM OF ALL RESPONSES TO PHQ9 QUESTIONS 1 & 2: 0

## 2023-02-23 NOTE — PROGRESS NOTES
Subjective:      Patient ID: Hyacinth Ariza is a 42 y.o. male who presents today for:  Chief Complaint   Patient presents with    Cough     Dry cough    Congestion     X4days tx OTC medication       HPI  Patient is here with c/o dry cough and mucus in throat. Says he has some tenderness to left side of neck. Says he has no SOB or chest tightness. Denies fever or chills. Says he has some nasal and sinus congestion. Says he feels like he has mucus in the throat. Says he has not had any GI symptoms. Says he is taking OTC cough and cold med, zinc, vit C for symptoms. Says he has been taking some Tylenol and Mucinex  as well. He is not a smoker, no underlying lung issues. He has HX heart disease.    Past Medical History:   Diagnosis Date    Angina, class IV (HCC)     reosolved as a stent    CAD (coronary artery disease)     Tobacco abuse      Past Surgical History:   Procedure Laterality Date    CORONARY ANGIOPLASTY WITH STENT PLACEMENT       Social History     Socioeconomic History    Marital status:      Spouse name: Not on file    Number of children: Not on file    Years of education: Not on file    Highest education level: Not on file   Occupational History    Not on file   Tobacco Use    Smoking status: Former     Packs/day: 0.25     Years: 25.00     Pack years: 6.25     Types: Cigarettes     Quit date: 2022     Years since quittin.2    Smokeless tobacco: Never    Tobacco comments:     started  age 13   Substance and Sexual Activity    Alcohol use: Yes     Comment: weekends    Drug use: No    Sexual activity: Not on file   Other Topics Concern    Not on file   Social History Narrative    Not on file     Social Determinants of Health     Financial Resource Strain: Low Risk     Difficulty of Paying Living Expenses: Not hard at all   Food Insecurity: No Food Insecurity    Worried About [facility] in the Last Year: Never true    [facility] in the Last Year: Never true Transportation Needs: Unknown    Lack of Transportation (Medical): Not on file    Lack of Transportation (Non-Medical): No   Physical Activity: Not on file   Stress: Not on file   Social Connections: Not on file   Intimate Partner Violence: Not on file   Housing Stability: Unknown    Unable to Pay for Housing in the Last Year: Not on file    Number of Places Lived in the Last Year: Not on file    Unstable Housing in the Last Year: No     Family History   Problem Relation Age of Onset    Hypertension Mother     Diabetes Mother     Heart Disease Mother     Lung Cancer Father      Allergies   Allergen Reactions    Pcn [Penicillins]     Shellfish-Derived Products      Current Outpatient Medications   Medication Sig Dispense Refill    azithromycin (ZITHROMAX) 250 MG tablet Take 1 tablet by mouth See Admin Instructions for 5 days 500mg on day 1 followed by 250mg on days 2 - 5 6 tablet 0    aspirin 81 MG chewable tablet Take 1 tablet by mouth daily 30 tablet 3    nitroGLYCERIN (NITROSTAT) 0.4 MG SL tablet up to max of 3 total doses. If no relief after 1 dose, call 911. (Patient not taking: Reported on 2/23/2023) 25 tablet 3    atorvastatin (LIPITOR) 40 MG tablet Take 1 tablet by mouth nightly (Patient not taking: Reported on 2/23/2023) 30 tablet 3    lisinopril (PRINIVIL;ZESTRIL) 10 MG tablet Take 1 tablet by mouth daily (Patient not taking: Reported on 2/23/2023) 30 tablet 3    metoprolol tartrate (LOPRESSOR) 25 MG tablet Take 1 tablet by mouth 2 times daily (Patient not taking: Reported on 2/23/2023) 60 tablet 3    ticagrelor (BRILINTA) 90 MG TABS tablet Take 1 tablet by mouth 2 times daily (Patient not taking: Reported on 2/23/2023) 60 tablet 6     No current facility-administered medications for this visit. Review of Systems   Constitutional:  Positive for activity change, chills and fatigue. Negative for appetite change, diaphoresis, fever and unexpected weight change.    HENT:  Positive for congestion, ear pain, postnasal drip, rhinorrhea, sinus pressure and sinus pain. Negative for sneezing, sore throat, tinnitus, trouble swallowing and voice change. Respiratory:  Positive for cough. Negative for chest tightness, shortness of breath, wheezing and stridor. Cardiovascular:  Negative for chest pain. Gastrointestinal:  Negative for abdominal pain, diarrhea, nausea and vomiting. Musculoskeletal:  Positive for arthralgias and myalgias. Skin:  Negative for rash. Neurological:  Positive for headaches. Negative for dizziness, weakness and light-headedness. Hematological:  Positive for adenopathy. Objective:   /78   Pulse 93   Temp 96.8 °F (36 °C) (Temporal)   Ht 5' 2\" (1.575 m) Comment: per pt  Wt 200 lb (90.7 kg) Comment: per pt  SpO2 97%   BMI 36.58 kg/m²     Physical Exam  Vitals reviewed. Constitutional:       General: He is awake. He is not in acute distress. Appearance: Normal appearance. He is well-developed, well-groomed and normal weight. He is not ill-appearing, toxic-appearing or diaphoretic. HENT:      Head: Normocephalic and atraumatic. Right Ear: Hearing, tympanic membrane, ear canal and external ear normal. There is no impacted cerumen. Left Ear: Hearing, tympanic membrane, ear canal and external ear normal. There is no impacted cerumen. Nose: Congestion and rhinorrhea present. Rhinorrhea is purulent. Right Turbinates: Swollen. Left Turbinates: Swollen. Right Sinus: Maxillary sinus tenderness present. No frontal sinus tenderness. Left Sinus: Maxillary sinus tenderness and frontal sinus tenderness present. Mouth/Throat:      Lips: Pink. Mouth: Mucous membranes are moist. No oral lesions. Dentition: No gum lesions. Tongue: No lesions. Palate: No lesions. Pharynx: Uvula midline. No pharyngeal swelling, oropharyngeal exudate, posterior oropharyngeal erythema or uvula swelling.       Tonsils: No tonsillar exudate or tonsillar abscesses. 0 on the right. 0 on the left. Comments: PND present. Eyes:      General: Lids are normal.      Extraocular Movements: Extraocular movements intact. Conjunctiva/sclera: Conjunctivae normal.   Neck:      Trachea: Trachea normal.   Cardiovascular:      Rate and Rhythm: Normal rate and regular rhythm. Pulses: Normal pulses. Heart sounds: Normal heart sounds, S1 normal and S2 normal.   Pulmonary:      Effort: Pulmonary effort is normal.      Breath sounds: Normal breath sounds and air entry. Musculoskeletal:      Cervical back: Normal range of motion and neck supple. No tenderness. Lymphadenopathy:      Cervical: No cervical adenopathy. Skin:     General: Skin is warm and dry. Capillary Refill: Capillary refill takes less than 2 seconds. Neurological:      General: No focal deficit present. Mental Status: He is alert and oriented to person, place, and time. Mental status is at baseline. Psychiatric:         Attention and Perception: Attention and perception normal.         Mood and Affect: Mood and affect normal.         Speech: Speech normal.         Behavior: Behavior normal. Behavior is cooperative. Thought Content: Thought content normal.         Cognition and Memory: Cognition and memory normal.         Judgment: Judgment normal.       Assessment:       Diagnosis Orders   1. Bronchitis  POCT Influenza A/B    azithromycin (ZITHROMAX) 250 MG tablet      2. Mixed hyperlipidemia  Lipid Panel    Comprehensive Metabolic Panel        Results for POC orders placed in visit on 02/23/23   POCT Influenza A/B   Result Value Ref Range    Influenza A Ab negative     Influenza B Ab negative       Plan:     Assessment & Plan   Rubin Grewal was seen today for cough and congestion. Diagnoses and all orders for this visit:    Bronchitis  -     POCT Influenza A/B  -     azithromycin (ZITHROMAX) 250 MG tablet;  Take 1 tablet by mouth See Admin Instructions for 5 days 500mg on day 1 followed by 250mg on days 2 - 5    Mixed hyperlipidemia  -     Lipid Panel; Future  -     Comprehensive Metabolic Panel; Future    Discussed with patient will treat with ANTB. Advised may cont with OTC medication as needed for sx. Advised to increase fluids and follow up if sx do not improve. Antibiotic Instructions:   Complete the full course of antibiotics as ordered. To prevent antibiotic resistances please take medication as ordered and for the full duration even if you start to feel better. Take each dose with a small snack or meal to lessen potential GI upset. Consider intake of yogurt or probiotic during antibiotic use and for a few days after to help reduce the risk of developing a secondary infection. Take the yogurt or probiotic at least 2 hours after taking the antibiotic. Avoid alcohol while taking antibiotics. If you are using contraception please use a back up method of contraception such as condoms during antibiotic use and for 7 days after completing treatment to prevent pregnancy. Orders Placed This Encounter   Procedures    Lipid Panel     Standing Status:   Future     Standing Expiration Date:   2/23/2024    Comprehensive Metabolic Panel     Standing Status:   Future     Standing Expiration Date:   2/23/2024    POCT Influenza A/B     Orders Placed This Encounter   Medications    azithromycin (ZITHROMAX) 250 MG tablet     Sig: Take 1 tablet by mouth See Admin Instructions for 5 days 500mg on day 1 followed by 250mg on days 2 - 5     Dispense:  6 tablet     Refill:  0     Medications Discontinued During This Encounter   Medication Reason    nicotine polacrilex (NICORETTE) 4 MG gum LIST CLEANUP     Return for worsening of condition, if symptoms do not improve in 3-5 days. Reviewed with the patient/family: current clinical status & medications.   Side effects of the medication prescribed today, as well as treatment plan/rationale and result expectations have been discussed with the patient/family who expresses understanding. Patient will be discharged home in stable condition. Follow up with PCP to evaluate treatment results or return if symptoms worsen or fail to improve. Discussed signs and symptoms which require immediate follow-up in ED/call to 911. Understanding verbalized. I have reviewed the patient's medical history in detail and updated the computerized patient record.     America Mccarthy, APRN - CNP

## 2023-02-24 SDOH — ECONOMIC STABILITY: TRANSPORTATION INSECURITY
IN THE PAST 12 MONTHS, HAS THE LACK OF TRANSPORTATION KEPT YOU FROM MEDICAL APPOINTMENTS OR FROM GETTING MEDICATIONS?: NO

## 2023-02-24 SDOH — ECONOMIC STABILITY: INCOME INSECURITY: IN THE LAST 12 MONTHS, WAS THERE A TIME WHEN YOU WERE NOT ABLE TO PAY THE MORTGAGE OR RENT ON TIME?: NO

## 2023-02-24 SDOH — ECONOMIC STABILITY: TRANSPORTATION INSECURITY
IN THE PAST 12 MONTHS, HAS LACK OF TRANSPORTATION KEPT YOU FROM MEETINGS, WORK, OR FROM GETTING THINGS NEEDED FOR DAILY LIVING?: NO

## 2023-02-24 ASSESSMENT — ENCOUNTER SYMPTOMS
TROUBLE SWALLOWING: 0
STRIDOR: 0
SINUS PRESSURE: 1
ABDOMINAL PAIN: 0
COUGH: 1
RHINORRHEA: 1
SORE THROAT: 0
SHORTNESS OF BREATH: 0
VOMITING: 0
SINUS PAIN: 1
VOICE CHANGE: 0
WHEEZING: 0
NAUSEA: 0
DIARRHEA: 0
CHEST TIGHTNESS: 0